# Patient Record
Sex: MALE | Race: ASIAN | NOT HISPANIC OR LATINO | URBAN - METROPOLITAN AREA
[De-identification: names, ages, dates, MRNs, and addresses within clinical notes are randomized per-mention and may not be internally consistent; named-entity substitution may affect disease eponyms.]

---

## 2017-01-18 ENCOUNTER — INPATIENT (INPATIENT)
Facility: HOSPITAL | Age: 52
LOS: 2 days | Discharge: HOME | DRG: 153 | End: 2017-01-21
Attending: OTOLARYNGOLOGY | Admitting: OTOLARYNGOLOGY
Payer: MEDICAID

## 2017-01-18 VITALS
RESPIRATION RATE: 20 BRPM | SYSTOLIC BLOOD PRESSURE: 134 MMHG | HEART RATE: 82 BPM | OXYGEN SATURATION: 98 % | HEIGHT: 66 IN | DIASTOLIC BLOOD PRESSURE: 84 MMHG | TEMPERATURE: 98 F | WEIGHT: 160.06 LBS

## 2017-01-18 DIAGNOSIS — J39.0 RETROPHARYNGEAL AND PARAPHARYNGEAL ABSCESS: ICD-10-CM

## 2017-01-18 LAB
ALBUMIN SERPL ELPH-MCNC: 3.8 G/DL — SIGNIFICANT CHANGE UP (ref 3.4–5)
ALP SERPL-CCNC: 103 U/L — SIGNIFICANT CHANGE UP (ref 40–120)
ALT FLD-CCNC: 19 U/L — SIGNIFICANT CHANGE UP (ref 12–42)
ANION GAP SERPL CALC-SCNC: 7 MMOL/L — LOW (ref 9–16)
APTT BLD: 37.1 SEC — SIGNIFICANT CHANGE UP (ref 27.5–37.4)
AST SERPL-CCNC: 10 U/L — LOW (ref 15–37)
BASOPHILS NFR BLD AUTO: 0.3 % — SIGNIFICANT CHANGE UP (ref 0–2)
BILIRUB SERPL-MCNC: 0.3 MG/DL — SIGNIFICANT CHANGE UP (ref 0.2–1.2)
BLD GP AB SCN SERPL QL: NEGATIVE — SIGNIFICANT CHANGE UP
BUN SERPL-MCNC: 18 MG/DL — SIGNIFICANT CHANGE UP (ref 7–23)
CALCIUM SERPL-MCNC: 8.6 MG/DL — SIGNIFICANT CHANGE UP (ref 8.5–10.5)
CHLORIDE SERPL-SCNC: 105 MMOL/L — SIGNIFICANT CHANGE UP (ref 96–108)
CO2 SERPL-SCNC: 27 MMOL/L — SIGNIFICANT CHANGE UP (ref 22–31)
CREAT SERPL-MCNC: 0.88 MG/DL — SIGNIFICANT CHANGE UP (ref 0.5–1.3)
EOSINOPHIL NFR BLD AUTO: 0.4 % — SIGNIFICANT CHANGE UP (ref 0–6)
GLUCOSE SERPL-MCNC: 208 MG/DL — HIGH (ref 70–99)
HCT VFR BLD CALC: 43.3 % — SIGNIFICANT CHANGE UP (ref 39–50)
HGB BLD-MCNC: 15.5 G/DL — SIGNIFICANT CHANGE UP (ref 13–17)
INR BLD: 1.08 — SIGNIFICANT CHANGE UP (ref 0.88–1.16)
LYMPHOCYTES # BLD AUTO: 29.2 % — SIGNIFICANT CHANGE UP (ref 13–44)
MCHC RBC-ENTMCNC: 30.9 PG — SIGNIFICANT CHANGE UP (ref 27–34)
MCHC RBC-ENTMCNC: 35.8 G/DL — SIGNIFICANT CHANGE UP (ref 32–36)
MCV RBC AUTO: 86.3 FL — SIGNIFICANT CHANGE UP (ref 80–100)
MONOCYTES NFR BLD AUTO: 8.2 % — SIGNIFICANT CHANGE UP (ref 2–14)
NEUTROPHILS NFR BLD AUTO: 61.9 % — SIGNIFICANT CHANGE UP (ref 43–77)
PLATELET # BLD AUTO: 172 K/UL — SIGNIFICANT CHANGE UP (ref 150–400)
POTASSIUM SERPL-MCNC: 4.6 MMOL/L — SIGNIFICANT CHANGE UP (ref 3.5–5.3)
POTASSIUM SERPL-SCNC: 4.6 MMOL/L — SIGNIFICANT CHANGE UP (ref 3.5–5.3)
PROT SERPL-MCNC: 8.2 G/DL — SIGNIFICANT CHANGE UP (ref 6.4–8.2)
PROTHROM AB SERPL-ACNC: 12 SEC — SIGNIFICANT CHANGE UP (ref 10–13.1)
RBC # BLD: 5.02 M/UL — SIGNIFICANT CHANGE UP (ref 4.2–5.8)
RBC # FLD: 12.2 % — SIGNIFICANT CHANGE UP (ref 10.3–16.9)
RH IG SCN BLD-IMP: POSITIVE — SIGNIFICANT CHANGE UP
SODIUM SERPL-SCNC: 139 MMOL/L — SIGNIFICANT CHANGE UP (ref 135–145)
WBC # BLD: 8 K/UL — SIGNIFICANT CHANGE UP (ref 3.8–10.5)
WBC # FLD AUTO: 8 K/UL — SIGNIFICANT CHANGE UP (ref 3.8–10.5)

## 2017-01-18 PROCEDURE — 99285 EMERGENCY DEPT VISIT HI MDM: CPT

## 2017-01-18 PROCEDURE — 31575 DIAGNOSTIC LARYNGOSCOPY: CPT

## 2017-01-18 PROCEDURE — 99221 1ST HOSP IP/OBS SF/LOW 40: CPT | Mod: 25

## 2017-01-18 RX ORDER — DEXTROSE 50 % IN WATER 50 %
1 SYRINGE (ML) INTRAVENOUS ONCE
Qty: 0 | Refills: 0 | Status: DISCONTINUED | OUTPATIENT
Start: 2017-01-18 | End: 2017-01-21

## 2017-01-18 RX ORDER — SODIUM CHLORIDE 9 MG/ML
1000 INJECTION, SOLUTION INTRAVENOUS
Qty: 0 | Refills: 0 | Status: DISCONTINUED | OUTPATIENT
Start: 2017-01-18 | End: 2017-01-18

## 2017-01-18 RX ORDER — AMPICILLIN SODIUM AND SULBACTAM SODIUM 250; 125 MG/ML; MG/ML
3 INJECTION, POWDER, FOR SUSPENSION INTRAMUSCULAR; INTRAVENOUS EVERY 6 HOURS
Qty: 0 | Refills: 0 | Status: DISCONTINUED | OUTPATIENT
Start: 2017-01-18 | End: 2017-01-21

## 2017-01-18 RX ORDER — ACETAMINOPHEN 500 MG
650 TABLET ORAL EVERY 6 HOURS
Qty: 0 | Refills: 0 | Status: DISCONTINUED | OUTPATIENT
Start: 2017-01-18 | End: 2017-01-21

## 2017-01-18 RX ORDER — INSULIN LISPRO 100/ML
VIAL (ML) SUBCUTANEOUS
Qty: 0 | Refills: 0 | Status: DISCONTINUED | OUTPATIENT
Start: 2017-01-18 | End: 2017-01-21

## 2017-01-18 RX ORDER — GLUCAGON INJECTION, SOLUTION 0.5 MG/.1ML
1 INJECTION, SOLUTION SUBCUTANEOUS ONCE
Qty: 0 | Refills: 0 | Status: DISCONTINUED | OUTPATIENT
Start: 2017-01-18 | End: 2017-01-21

## 2017-01-18 RX ORDER — AMPICILLIN SODIUM AND SULBACTAM SODIUM 250; 125 MG/ML; MG/ML
3 INJECTION, POWDER, FOR SUSPENSION INTRAMUSCULAR; INTRAVENOUS ONCE
Qty: 0 | Refills: 0 | Status: COMPLETED | OUTPATIENT
Start: 2017-01-18 | End: 2017-01-18

## 2017-01-18 RX ORDER — DEXTROSE 50 % IN WATER 50 %
12.5 SYRINGE (ML) INTRAVENOUS ONCE
Qty: 0 | Refills: 0 | Status: DISCONTINUED | OUTPATIENT
Start: 2017-01-18 | End: 2017-01-21

## 2017-01-18 RX ORDER — METFORMIN HYDROCHLORIDE 850 MG/1
500 TABLET ORAL EVERY 12 HOURS
Qty: 0 | Refills: 0 | Status: DISCONTINUED | OUTPATIENT
Start: 2017-01-18 | End: 2017-01-20

## 2017-01-18 RX ADMIN — AMPICILLIN SODIUM AND SULBACTAM SODIUM 200 GRAM(S): 250; 125 INJECTION, POWDER, FOR SUSPENSION INTRAMUSCULAR; INTRAVENOUS at 23:49

## 2017-01-18 RX ADMIN — METFORMIN HYDROCHLORIDE 500 MILLIGRAM(S): 850 TABLET ORAL at 21:56

## 2017-01-18 RX ADMIN — Medication 1: at 21:56

## 2017-01-18 RX ADMIN — AMPICILLIN SODIUM AND SULBACTAM SODIUM 200 GRAM(S): 250; 125 INJECTION, POWDER, FOR SUSPENSION INTRAMUSCULAR; INTRAVENOUS at 18:50

## 2017-01-18 NOTE — ED PROVIDER NOTE - ENMT, MLM
Airway patent, Nasal mucosa clear. Mouth with normal mucosa. Throat has no vesicles, no oropharyngeal exudates and uvula is midline. No stridor, no drooling, speaking without issue

## 2017-01-18 NOTE — ED ADULT TRIAGE NOTE - CHIEF COMPLAINT QUOTE
Patient c/o sore throat and difficulty breathing while laying flat x 1 month. CT done today shows, "retropharyngeal mass" with "significant compression and anterior left sided displacement of the airway."

## 2017-01-18 NOTE — H&P ADULT. - HISTORY OF PRESENT ILLNESS
HPI: 51y Male with PMH sign for DMII sent by outside ENT for evaluation and treatment of likely right retropharyngeal abscess. Patient has had a sore throat x 1 mo with some difficulty swallowing food but able to swallow food with water. Was seen at and outside ENT's office 8 days ago for evaluation. At that time he was sent for CT which showed a chronic right retropharyngeal abscess. Patient presented for follow up today with the outside ENT who sent him to the ER.  Tolerating diet. Breathing comfortably no SOB. c/o some discomfort when supine at night, otherwise no issues. no wbc. Denies voice change. Discussed patient with outside ENT Dr. Bibi Shanks.    Denies fevers/chills/nausea/vomitting/weightloss. Denies smoking/drinking. No toxic habits.     Allergies    No Known Allergies    Intolerances        PAST MEDICAL & SURGICAL HISTORY:      SOCIAL HISTORY:  Tobacco History: denies  ETOH Use: denies  Drug Use: denies    FAMILY HISTORY:      REVIEW OF SYSTEMS    General: denies f/c    HEENT: as above  Respiratory: as above  Endocrine: DM        Vital Signs Last 24 Hrs  T(C): 36.9, Max: 36.9 (01-18 @ 16:38)  T(F): 98.4, Max: 98.4 (01-18 @ 16:38)  HR: 82 (82 - 82)  BP: 134/84 (134/84 - 134/84)  BP(mean): --  RR: 20 (20 - 20)  SpO2: 98% (98% - 98%)    LABS:  CBC-    18 Jan 2017 17:23    139    |  105    |  18     ----------------------------<  208    4.6     |  27     |  0.88     Ca    8.6        18 Jan 2017 17:23    TPro  8.2    /  Alb  3.8    /  TBili  0.3    /  DBili  x      /  AST  10     /  ALT  19     /  AlkPhos  103    18 Jan 2017 17:23    Coagulation Studies-  PT/INR - ( 18 Jan 2017 17:23 )   PT: 12.0 sec;   INR: 1.08          PTT - ( 18 Jan 2017 17:23 )  PTT:37.1 sec  Endocrine Panel-  --  --  8.6 mg/dL        PHYSICAL EXAM:    ENT EXAM-   Constitutional: Well-developed, well-nourished.   voice normal   Head:  normocephalic, atraumatic.   OC/OP:  bulging right posterior oropharynx; Floor of mouth, buccal mucosa, lips, hard palate, soft palate, uvula, Mucosa moist; poor dentition  Neck:  Trachea midline. nontender to palpation.       Laryngoscopy Findings:   LARYNGOSCOPY EXAM:     -Verbal consent was obtained from patient prior to procedure.    Indication: retropharyngeal abscess    Afrin spray was applied to the nasal cavities.    Flexible laryngoscopy was performed and revealed the following:    -- Nasopharynx had no mass or exudate.    -- Base of tongue was symmetric and not enlarged.    -- submucosal bulging posterior and right lateral pharyngeal wall from the level of the oropharynx to the level of the epiglottis obscuring the glottis, however, able to see the glottis by maneuvering past the right sided bulging.     -- Vallecula was clear    -- Epiglottis, both aryepiglottic folds and both false vocal folds were normal, non-edematous     -- Arytenoids both without edema and erythema     -- True vocal folds were fully mobile and without lesions or edema     -- Post cricoid area was clear.    The patient tolerated the procedure well.    RADIOLOGY & ADDITIONAL STUDIES:  suspicious for developing retropharyngeal abscess 5.7x2.8x2.1 vs neoplasm      Assessment/Plan:  51y Male with likely chronic right retropharyngeal mass. No urgent need for OR at this time.   -Admit to ENT   -Unasyn   -f/u ESR/CRP  -f/u blood cultures  -diabetic diet  -ISS  -SCD  -HSQ  -ambulate   -oobtc  -hob elevated 30 degree    d/w attending who agrees with the above plan    [facility] at [phone] with any questions/concerns. HPI: 51y Male with PMH sign for DMII sent by outside ENT for evaluation and treatment of likely right retropharyngeal abscess.     Patient has had throat pain x 1 mo with odynophagia and some difficulty swallowing food but able to swallow food with water.   Was seen by ENT Karlos Shanks 8 days ago for evaluation and placed on omeprazole.  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 Patient presented for follow up today with the outside ENT who sent him to the ER.    He is tolerating diet. he reports throat pain is diminishing but over the last 2 weeks he has developed difficulty breathing when lying down in sleep.  He said that he stops breathing for a few seconds at a time.    States that his voice has changed.   He is afraid that this development is related to starting Invokana 3 months ago.  Denies fevers/chills/nausea/vomitting/weightloss.  Denies smoking/drinking. No toxic habits. Works as a .  No sick household contacts.  Denies hx of TB disease or exposure.  No trauma to neck  Discussed patient with outside ENT Dr. Bibi Shanks.      PAST MEDICAL & SURGICAL HISTORY:  Diabetes  No prior surgery    ALLERGIES  No Known Allergies    MEDICATIONS:  Metformin  Januvia  Invokana    SOCIAL HISTORY:  Tobacco History: denies  ETOH Use: denies  Drug Use: denies  Works as   Hal[employer] diet    FAMILY HISTORY:  N/C    REVIEW OF SYSTEMS  General: denies ffever, chills  HEENT: as above  Respiratory: as above  Endocrine: DM  Neruro:  no weakness or headache  CV:  no palpitations or chest pain  MS:  no swelling or joint pain  Skin: no rash  Allergy:  no food allergy  Psych:  no depression  :  no dysuria or hematuria  GI:  no constipation or bloody stools      Vital Signs Last 24 Hrs  T(C): 36.9, Max: 36.9 (01-18 @ 16:38)  T(F): 98.4, Max: 98.4 (01-18 @ 16:38)  HR: 82 (82 - 82)  BP: 134/84 (134/84 - 134/84)  BP(mean): --  RR: 20 (20 - 20)  SpO2: 98% (98% - 98%)    LABS:  CBC-    18 Jan 2017 17:23    139    |  105    |  18     ----------------------------<  208    4.6     |  27     |  0.88     Ca    8.6        18 Jan 2017 17:23    TPro  8.2    /  Alb  3.8    /  TBili  0.3    /  DBili  x      /  AST  10     /  ALT  19     /  AlkPhos  103    18 Jan 2017 17:23    Coagulation Studies-  PT/INR - ( 18 Jan 2017 17:23 )   PT: 12.0 sec;   INR: 1.08          PTT - ( 18 Jan 2017 17:23 )  PTT:37.1 sec  Endocrine Panel-  --  --  8.6 mg/dL        PHYSICAL EXAM:    ENT EXAM-   Constitutional: Well-developed, well-nourished.   voice normal   Head:  normocephalic, atraumatic.   OC/OP:  bulging right posterior oropharynx; Floor of mouth, buccal mucosa, lips, hard palate, soft palate, uvula, Mucosa moist; poor dentition  Neck:  Trachea midline. nontender to palpation.       Laryngoscopy Findings:   LARYNGOSCOPY EXAM:     -Verbal consent was obtained from patient prior to procedure.    Indication: retropharyngeal abscess    Afrin spray was applied to the nasal cavities.    Flexible laryngoscopy was performed and revealed the following:    -- Nasopharynx had no mass or exudate.    -- Base of tongue was symmetric and not enlarged.    -- submucosal bulging posterior and right lateral pharyngeal wall from the level of the oropharynx to the level of the epiglottis obscuring the glottis, however, able to see the glottis by maneuvering past the right sided bulging.     -- Vallecula was clear    -- Epiglottis, both aryepiglottic folds and both false vocal folds were normal, non-edematous     -- Arytenoids both without edema and erythema     -- True vocal folds were fully mobile and without lesions or edema     -- Post cricoid area was clear.    The patient tolerated the procedure well.    RADIOLOGY & ADDITIONAL STUDIES:  suspicious for developing retropharyngeal abscess 5.7x2.8x2.1 vs neoplasm      Assessment/Plan:  51y Male with likely chronic right retropharyngeal mass. No urgent need for OR at this time.   -Admit to ENT   -Unasyn   -f/u ESR/CRP  -f/u blood cultures  -diabetic diet  -ISS  -SCD  -HSQ  -ambulate   -oobtc  -hob elevated 30 degree    d/w attending who agrees with the above plan    [facility] at [phone] with any questions/concerns. HPI: 51y Male with PMH sign for DMII sent by outside ENT for evaluation and treatment of likely right retropharyngeal abscess.     Patient has had throat pain x 1 mo with odynophagia and some difficulty swallowing food but able to swallow food with water.   Was seen by ENT Karlos Shanks 8 days ago for evaluation and placed on omeprazole.  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 Patient presented for follow up today with the outside ENT who sent him to the ER.    He is tolerating diet. he reports throat pain is diminishing but over the last 2 weeks he has developed difficulty breathing when lying down in sleep.  He said that he stops breathing for a few seconds at a time.    States that his voice has changed.   He is afraid that this development is related to starting Invokana 3 months ago.  Denies fevers/chills/nausea/vomitting/weightloss.  Denies smoking/drinking. No toxic habits. Works as a .  No sick household contacts.  Denies hx of TB disease or exposure.  No trauma to neck  Discussed patient with outside ENT Dr. Bibi Shanks.      PAST MEDICAL & SURGICAL HISTORY:  Diabetes  No prior surgery    ALLERGIES  No Known Allergies    MEDICATIONS:  Metformin  Januvia  Invokana    SOCIAL HISTORY:  Tobacco History: denies  ETOH Use: denies  Drug Use: denies  Works as   Halal diet    FAMILY HISTORY:  N/C    REVIEW OF SYSTEMS  General: denies ffever, chills  HEENT: as above  Respiratory: as above  Endocrine: DM  Neruro:  no weakness or headache  CV:  no palpitations or chest pain  MS:  no swelling or joint pain  Skin: no rash  Allergy:  no food allergy  Psych:  no depression  :  no dysuria or hematuria  GI:  no constipation or bloody stools      Vital Signs Last 24 Hrs  T(C): 36.9, Max: 36.9 (01-18 @ 16:38)  T(F): 98.4, Max: 98.4 (01-18 @ 16:38)  HR: 82 (82 - 82)  BP: 134/84 (134/84 - 134/84)  BP(mean): --  RR: 20 (20 - 20)  SpO2: 98% (98% - 98%)    LABS- 18 Jan 2017 17:23  CBC            15.3  8.0 >---------< 172   (P69%)          43.8    139    |  105    |  18     ----------------------------<  208    4.6     |  27     |  0.88     Ca    8.6           TPro  8.2    /  Alb  3.8    /  TBili  0.3    /  DBili  x      /  AST  10     /  ALT  19     /  AlkPhos  103      PT: 12.0 sec;   INR: 1.08     PTT:37.1 sec        PHYSICAL EXAM:    ENT EXAM-   Constitutional: Well-developed, well-nourished. No stridor.  Voice normal   Head:  normocephalic, atraumatic. Parotid and SM glands nontender, nonenlarged bilateral.  OC/OP:  bulging right posterior oropharynx; Floor of mouth, buccal mucosa, lips, hard palate, soft palate, uvula, Mucosa moist; poor dentition  Nose:  clear anteriorly  Neck:  Trachea midline. nontender to palpation.   Lymph:  fullness right level II LN nontender  Lungs:  clear  CV:  RRR.  Carotid pulses 2+ bilat  Ext: no LE edema  Skin:  no erythema or rash  Neuro:  CN 2-12 grossly intact  Eyes:  EOMI.  wears glasses.  Psych:  mood stable.  Affect appropriate.  Calm.    Laryngoscopy Findings:   LARYNGOSCOPY EXAM:   -Verbal consent was obtained from patient prior to procedure.  Indication: retropharyngeal abscess, airway obstruction  Afrin spray was applied to the nasal cavities.    Flexible laryngoscopy was performed and revealed the following:    -- Nasopharynx had no mass or exudate.    -- Base of tongue was symmetric and not enlarged.    -- submucosal bulging posterior and right lateral pharyngeal wall from the level of the oropharynx to the level of the epiglottis.         Bulge overhangs and obscuresthe glottis but able to see the glottis by maneuvering scope past the right-sided bulge     -- Vallecula was clear    -- Epiglottis, both aryepiglottic folds and both false vocal folds were normal, non-edematous     -- Arytenoids both without edema and erythema     -- True vocal folds were fully mobile and without lesions or edema     -- Post cricoid area was clear.    -- No pooled secretions.    The patient tolerated the procedure well.    RADIOLOGY & ADDITIONAL STUDIES:  CT neck with contast was reviewed.  -- suspicious for retropharyngeal abscess 5.7 x 2.8 x 2 .1 cm with phlegmonous areas.  -- largest hypodense area is 2 cm.  -- Less likely to be neoplasm      Assessment/Plan:  51y Male with likely chronic right retropharyngeal mass. Concern for new sx of airway obstruction during sleep.  No urgent need for OR at this time.   -Admit to ENT   -Unasyn   -f/u ESR/CRP  -f/u blood cultures  -diabetic diet  -ISS  -SCD  -HSQ  -ambulate   -oobtc  -hob elevated 30 degree    d/w Dr. Shields who agrees with the above plan    [facility] at [phone] with any questions/concerns.

## 2017-01-18 NOTE — ED ADULT NURSE NOTE - OBJECTIVE STATEMENT
51 year old male patient with c/o of sore throat x1 month.  Patient states he went for CT Scan which showed a mass that is causing displacement of airway.  Patient A+OX3, ambulatory with steady gait.  Patient appears comfortable, no distress noted.  Speaking in full sentences wo difficulty.  States it justpainful to swallow.

## 2017-01-18 NOTE — H&P ADULT. - ATTENDING COMMENTS
Mr. Martinez appears to have chronic retropharyngeal abscess with throat pain that is decreasing but has airway obstruction when lying down to sleep.  Has had no antimicrobial treatment.  No large collection on CT scan.  Will try iv antibiotics first.  may require surgical intervention if worsens.

## 2017-01-18 NOTE — ED PROVIDER NOTE - MEDICAL DECISION MAKING DETAILS
sent in by his private ENT to ED for concerning CT scan and read for subacute abscess formation in retropharyngeal space with pressure on airway (but no clinical airway compromise at this time). plan for unasyn, labs including ESR/CRP, blood cultures, and admission for further monitoring

## 2017-01-18 NOTE — ED PROVIDER NOTE - OBJECTIVE STATEMENT
50yo M no PMH sent in by his ENT Dr. Karlos Shanks for abnormal CT scan with swelling to retropharyngeal space and edema. Symptoms began 1 month ago, seen by PMD, referred to ENT, had imaging done 2 days ago. Pt here with CD and report. Denies stridor, able to take po, but admits to drooling at night. No other aggravating or alleviating factors. Pain is in mid neck with no radiation

## 2017-01-19 LAB
CRP SERPL-MCNC: 2.16 MG/DL — HIGH
ERYTHROCYTE [SEDIMENTATION RATE] IN BLOOD: 22 MM/HR — HIGH
HBA1C BLD-MCNC: 7.4 % — HIGH (ref 4.8–5.6)

## 2017-01-19 PROCEDURE — 99231 SBSQ HOSP IP/OBS SF/LOW 25: CPT

## 2017-01-19 RX ORDER — HEPARIN SODIUM 5000 [USP'U]/ML
5000 INJECTION INTRAVENOUS; SUBCUTANEOUS EVERY 8 HOURS
Qty: 0 | Refills: 0 | Status: DISCONTINUED | OUTPATIENT
Start: 2017-01-19 | End: 2017-01-21

## 2017-01-19 RX ADMIN — Medication 2: at 12:11

## 2017-01-19 RX ADMIN — AMPICILLIN SODIUM AND SULBACTAM SODIUM 200 GRAM(S): 250; 125 INJECTION, POWDER, FOR SUSPENSION INTRAMUSCULAR; INTRAVENOUS at 05:34

## 2017-01-19 RX ADMIN — AMPICILLIN SODIUM AND SULBACTAM SODIUM 200 GRAM(S): 250; 125 INJECTION, POWDER, FOR SUSPENSION INTRAMUSCULAR; INTRAVENOUS at 23:37

## 2017-01-19 RX ADMIN — Medication 1: at 17:12

## 2017-01-19 RX ADMIN — Medication 1: at 21:24

## 2017-01-19 RX ADMIN — AMPICILLIN SODIUM AND SULBACTAM SODIUM 200 GRAM(S): 250; 125 INJECTION, POWDER, FOR SUSPENSION INTRAMUSCULAR; INTRAVENOUS at 17:12

## 2017-01-19 RX ADMIN — METFORMIN HYDROCHLORIDE 500 MILLIGRAM(S): 850 TABLET ORAL at 05:34

## 2017-01-19 RX ADMIN — HEPARIN SODIUM 5000 UNIT(S): 5000 INJECTION INTRAVENOUS; SUBCUTANEOUS at 13:47

## 2017-01-19 RX ADMIN — METFORMIN HYDROCHLORIDE 500 MILLIGRAM(S): 850 TABLET ORAL at 17:12

## 2017-01-19 RX ADMIN — AMPICILLIN SODIUM AND SULBACTAM SODIUM 200 GRAM(S): 250; 125 INJECTION, POWDER, FOR SUSPENSION INTRAMUSCULAR; INTRAVENOUS at 12:11

## 2017-01-19 RX ADMIN — HEPARIN SODIUM 5000 UNIT(S): 5000 INJECTION INTRAVENOUS; SUBCUTANEOUS at 21:24

## 2017-01-19 NOTE — PROGRESS NOTE ADULT - SUBJECTIVE AND OBJECTIVE BOX
ENT [facility] DAILY PROGRESS NOTE    Overnight events/Interval HPI: 51y Male w/ chronic retropharyngeal abscess now HD 1 on IV unasyn. JORDY overnight. No new complaints this AM. Breathing without issue. Able to tolerate PO. Given home metformin. Pharmacy does not have other DM meds and put on sliding scale.          Allergies    No Known Allergies    Intolerances        MEDICATIONS:  Antiinfectives:   ampicillin/sulbactam  IVPB 3Gram(s) IV Intermittent every 6 hours    IV fluids:    Hematologic/Anticoagulation:    Pain medications/Neuro:  acetaminophen   Tablet. 650milliGRAM(s) Oral every 6 hours PRN    Endocrine Medications:   insulin lispro (HumaLOG) corrective regimen sliding scale  SubCutaneous Before meals and at bedtime  dextrose Gel 1Dose(s) Oral once PRN  dextrose 50% Injectable 12.5Gram(s) IV Push once  glucagon  Injectable 1milliGRAM(s) IntraMuscular once PRN  metFORMIN 500milliGRAM(s) Oral every 12 hours    All other standing medications:     All other PRN medications:      Vital Signs Last 24 Hrs  T(C): 36.7, Max: 36.9 (01-18 @ 16:38)  T(F): 98.1, Max: 98.4 (01-18 @ 16:38)  HR: 91 (75 - 91)  BP: 117/81 (113/71 - 134/84)  BP(mean): --  RR: 15 (15 - 20)  SpO2: 100% (97% - 100%)      I & Os for current day (as of 01-19 @ 09:48)  =============================================  IN:    Oral Fluid: 320 ml    Solution: 200 ml    Total IN: 520 ml  ---------------------------------------------  OUT:    Voided: 1450 ml    Total OUT: 1450 ml  ---------------------------------------------  Total NET: -930 ml        PHYSICAL EXAM:    Voice normal   Head:  normocephalic, atraumatic. Parotid and SM glands nontender, nonenlarged bilateral.  OC/OP:  bulging right posterior oropharynx; Floor of mouth, buccal mucosa, lips, hard palate, soft palate, uvula, Mucosa moist; poor dentition  Nose:  clear anteriorly  Neck:  Trachea midline. nontender to palpation.   Lymph:  fullness right level II LN nontender  Lungs:  non-labored breathing, no stridor  Eyes:  EOMI.  wears glasses.      LABS:  CBC-                        15.5   8.0   )-----------( 172      ( 18 Jan 2017 17:23 )             43.3     BMP/CMP-  18 Jan 2017 17:23    139    |  105    |  18     ----------------------------<  208    4.6     |  27     |  0.88     Ca    8.6        18 Jan 2017 17:23    TPro  8.2    /  Alb  3.8    /  TBili  0.3    /  DBili  x      /  AST  10     /  ALT  19     /  AlkPhos  103    18 Jan 2017 17:23    Coagulation Studies-  PT/INR - ( 18 Jan 2017 17:23 )   PT: 12.0 sec;   INR: 1.08          PTT - ( 18 Jan 2017 17:23 )  PTT:37.1 sec  Endocrine Panel-  Calcium, Total Serum: 8.6 mg/dL (01-18 @ 17:23)              RADIOLOGY & ADDITIONAL STUDIES:      Assessment/Plan:  51y Male with likely chronic right retropharyngeal mass. Concern for new sx of airway obstruction during sleep.  No urgent need for OR at this time.   -Admit to ENT   -Unasyn   -f/u ESR/CRP  -f/u blood cultures  -diabetic diet  -ISS + metformin  -SCD  -HSQ  -ambulate   -oobtc  -hob elevated 30 degree    d/w Dr. Shields who agrees with the above plan    [facility] at [phone] with any questions/concerns. ENT [facility] DAILY PROGRESS NOTE    Overnight events/Interval HPI: 51y Male w/ chronic retropharyngeal abscess now HD 1 on IV unasyn.   JORDY overnight.  No new complaints this AM.   Breathing without issue when upright, only when lying down.   Able to tolerate PO.   Given home metformin. Pharmacy does not have other DM meds and put on sliding scale.          Allergies    No Known Allergies      MEDICATIONS:  Antiinfectives:   ampicillin/sulbactam  IVPB 3Gram(s) IV Intermittent every 6 hours    Pain medications/Neuro:  acetaminophen   Tablet. 650milliGRAM(s) Oral every 6 hours PRN    Endocrine Medications:   insulin lispro (HumaLOG) corrective regimen sliding scale  SubCutaneous Before meals and at bedtime  dextrose Gel 1Dose(s) Oral once PRN  dextrose 50% Injectable 12.5Gram(s) IV Push once  glucagon  Injectable 1milliGRAM(s) IntraMuscular once PRN  metFORMIN 500milliGRAM(s) Oral every 12 hours      Vital Signs Last 24 Hrs  T(C): 36.7, Max: 36.9 (01-18 @ 16:38)  T(F): 98.1, Max: 98.4 (01-18 @ 16:38)  HR: 91 (75 - 91)  BP: 117/81 (113/71 - 134/84)  RR: 15 (15 - 20)  SpO2: 100% (97% - 100%)      I & Os for current day (as of 01-19 @ 09:48)  =============================================  IN:    Oral Fluid: 320 ml    Solution: 200 ml    Total IN: 520 ml  ---------------------------------------------  OUT:    Voided: 1450 ml    Total OUT: 1450 ml  ---------------------------------------------  Total NET: -930 ml        PHYSICAL EXAM:    Voice normal   Head:  normocephalic, atraumatic. Parotid and SM glands nontender, nonenlarged bilateral.  OC/OP:  bulging right posterior oropharynx; Floor of mouth, buccal mucosa, lips, hard palate, soft palate, uvula, Mucosa moist; poor dentition  Nose:  clear anteriorly  Neck:  Trachea midline. nontender to palpation.   Lymph:  fullness right level II LN nontender  Lungs:  non-labored breathing, no stridor  Eyes:  EOMI.  wears glasses.      LABS:  CBC-                        15.5   8.0   )-----------( 172      ( 18 Jan 2017 17:23 )             43.3     BMP/CMP-  18 Jan 2017 17:23    139    |  105    |  18     ----------------------------<  208    4.6     |  27     |  0.88     Ca    8.6        18 Jan 2017 17:23    TPro  8.2    /  Alb  3.8    /  TBili  0.3    /  DBili  x      /  AST  10     /  ALT  19     /  AlkPhos  103    18 Jan 2017 17:23    Coagulation Studies-  PT/INR - ( 18 Jan 2017 17:23 )   PT: 12.0 sec;   INR: 1.08          PTT - ( 18 Jan 2017 17:23 )  PTT:37.1 sec

## 2017-01-20 LAB
CRP SERPL-MCNC: 2.66 MG/DL — HIGH
ERYTHROCYTE [SEDIMENTATION RATE] IN BLOOD: 33 MM/HR — HIGH
HCT VFR BLD CALC: 41.3 % — SIGNIFICANT CHANGE UP (ref 39–50)
HGB BLD-MCNC: 14.5 G/DL — SIGNIFICANT CHANGE UP (ref 13–17)
MCHC RBC-ENTMCNC: 29.7 PG — SIGNIFICANT CHANGE UP (ref 27–34)
MCHC RBC-ENTMCNC: 35.1 G/DL — SIGNIFICANT CHANGE UP (ref 32–36)
MCV RBC AUTO: 84.6 FL — SIGNIFICANT CHANGE UP (ref 80–100)
PLATELET # BLD AUTO: 170 K/UL — SIGNIFICANT CHANGE UP (ref 150–400)
RBC # BLD: 4.88 M/UL — SIGNIFICANT CHANGE UP (ref 4.2–5.8)
RBC # FLD: 11.8 % — SIGNIFICANT CHANGE UP (ref 10.3–16.9)
WBC # BLD: 6.6 K/UL — SIGNIFICANT CHANGE UP (ref 3.8–10.5)
WBC # FLD AUTO: 6.6 K/UL — SIGNIFICANT CHANGE UP (ref 3.8–10.5)

## 2017-01-20 PROCEDURE — 70491 CT SOFT TISSUE NECK W/DYE: CPT | Mod: 26

## 2017-01-20 PROCEDURE — 99232 SBSQ HOSP IP/OBS MODERATE 35: CPT

## 2017-01-20 PROCEDURE — 99223 1ST HOSP IP/OBS HIGH 75: CPT

## 2017-01-20 RX ADMIN — AMPICILLIN SODIUM AND SULBACTAM SODIUM 200 GRAM(S): 250; 125 INJECTION, POWDER, FOR SUSPENSION INTRAMUSCULAR; INTRAVENOUS at 12:09

## 2017-01-20 RX ADMIN — METFORMIN HYDROCHLORIDE 500 MILLIGRAM(S): 850 TABLET ORAL at 06:31

## 2017-01-20 RX ADMIN — Medication 1: at 12:30

## 2017-01-20 RX ADMIN — HEPARIN SODIUM 5000 UNIT(S): 5000 INJECTION INTRAVENOUS; SUBCUTANEOUS at 23:06

## 2017-01-20 RX ADMIN — Medication 1: at 17:17

## 2017-01-20 RX ADMIN — HEPARIN SODIUM 5000 UNIT(S): 5000 INJECTION INTRAVENOUS; SUBCUTANEOUS at 06:31

## 2017-01-20 RX ADMIN — AMPICILLIN SODIUM AND SULBACTAM SODIUM 200 GRAM(S): 250; 125 INJECTION, POWDER, FOR SUSPENSION INTRAMUSCULAR; INTRAVENOUS at 06:31

## 2017-01-20 RX ADMIN — AMPICILLIN SODIUM AND SULBACTAM SODIUM 200 GRAM(S): 250; 125 INJECTION, POWDER, FOR SUSPENSION INTRAMUSCULAR; INTRAVENOUS at 18:17

## 2017-01-20 NOTE — PROGRESS NOTE ADULT - SUBJECTIVE AND OBJECTIVE BOX
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     JORDY overnight. No new complaints this AM. Breathing without issue. Able to tolerate PO, per pt improved swallowing over past day. No fevers/chills, no difficulty breathing, no throat pain.       Allergies    No Known Allergies      MEDICATIONS:  Antiinfectives:   ampicillin/sulbactam  IVPB 3Gram(s) IV Intermittent every 6 hours    IV fluids:    Hematologic/Anticoagulation:  heparin  Injectable 5000Unit(s) SubCutaneous every 8 hours    Pain medications/Neuro:  acetaminophen   Tablet. 650milliGRAM(s) Oral every 6 hours PRN    Endocrine Medications:   insulin lispro (HumaLOG) corrective regimen sliding scale  SubCutaneous Before meals and at bedtime  dextrose Gel 1Dose(s) Oral once PRN  dextrose 50% Injectable 12.5Gram(s) IV Push once  glucagon  Injectable 1milliGRAM(s) IntraMuscular once PRN  metFORMIN 500milliGRAM(s) Oral every 12 hours    All other standing medications:     All other PRN medications:      Vital Signs Last 24 Hrs  T(C): 36.5, Max: 37.1 (01-19 @ 20:37)  T(F): 97.7, Max: 98.8 (01-19 @ 20:37)  HR: 72 (72 - 91)  BP: 111/73 (109/73 - 125/79)  BP(mean): --  RR: 16 (15 - 17)  SpO2: 100% (97% - 100%)      I & Os for current day (as of 01-20 @ 08:42)  =============================================  IN:    Oral Fluid: 1280 ml    Total IN: 1280 ml  ---------------------------------------------  OUT:    Voided: 3220 ml    Total OUT: 3220 ml  ---------------------------------------------  Total NET: -1940 ml        PHYSICAL EXAM:    Voice normal   Head:  normocephalic, atraumatic. Parotid and SM glands nontender, nonenlarged bilateral.  OC/OP:  bulging right posterior oropharynx, slightly improved since yesterday Floor of mouth, buccal mucosa, lips, hard palate, soft palate, uvula, Mucosa moist; poor dentition  Nose:  clear anteriorly  Neck:  Trachea midline. non-tender to palpation.   Lymph:  fullness right level II LN nontender  Lungs:  non-labored breathing, no stridor  Eyes:  EOMI.  wears glasses.    LABS:  CBC-                        15.5   8.0   )-----------( 172      ( 18 Jan 2017 17:23 )             43.3     BMP/CMP-  18 Jan 2017 17:23    139    |  105    |  18     ----------------------------<  208    4.6     |  27     |  0.88     Ca    8.6        18 Jan 2017 17:23    TPro  8.2    /  Alb  3.8    /  TBili  0.3    /  DBili  x      /  AST  10     /  ALT  19     /  AlkPhos  103    18 Jan 2017 17:23    Coagulation Studies-  PT/INR - ( 18 Jan 2017 17:23 )   PT: 12.0 sec;   INR: 1.08          PTT - ( 18 Jan 2017 17:23 )  PTT:37.1 sec      Assessment/Plan:  51y Male with likely chronic right retropharyngeal mass. No urgent need for OR at this time. Plan to c/w IV abx and repeat neck CT today to evaluate response.  -c/w unasyn   -f/u ESR/CRP  -f/u blood cultures  -diabetic diet  -ISS + metformin  -SCD  -HSQ  -ambulate   -oobtc  -hob elevated 30 degree  -CT scan neck with contrast today    d/w Dr. Shields who agrees with the above plan    [facility] at [phone] with any questions/concerns.    dispo:   -no home care needs at this time ENT [facility] DAILY PROGRESS NOTE    Overnight events/Interval HPI: 51y Male w/ chronic retropharyngeal abscess now hospital day 2 -- on IV unasyn since evening of 1/18/17.     JORDY overnight. No new complaints this AM.   Breathing without issue when sleeping last night (first time).   Able to tolerate PO, per pt improved swallowing over past day.   No fevers/chills, no difficulty breathing, no throat pain.       Allergies    No Known Allergies      MEDICATIONS:  Antiinfectives:   ampicillin/sulbactam  IVPB 3Gram(s) IV Intermittent every 6 hours    Hematologic/Anticoagulation:  heparin  Injectable 5000Unit(s) SubCutaneous every 8 hours    Pain medications/Neuro:  acetaminophen   Tablet. 650milliGRAM(s) Oral every 6 hours PRN    Endocrine Medications:   insulin lispro (HumaLOG) corrective regimen sliding scale  SubCutaneous Before meals and at bedtime  dextrose Gel 1Dose(s) Oral once PRN  dextrose 50% Injectable 12.5Gram(s) IV Push once  glucagon  Injectable 1milliGRAM(s) IntraMuscular once PRN  metFORMIN 500milliGRAM(s) Oral every 12 hours      Vital Signs Last 24 Hrs  T(C): 36.5, Max: 37.1 (01-19 @ 20:37)  T(F): 97.7, Max: 98.8 (01-19 @ 20:37)  HR: 72 (72 - 91)  BP: 111/73 (109/73 - 125/79)  RR: 16 (15 - 17)  SpO2: 100% (97% - 100%)      I & Os for current day (as of 01-20 @ 08:42)  =============================================  IN:    Oral Fluid: 1280 ml    Total IN: 1280 ml  ---------------------------------------------  OUT:    Voided: 3220 ml    Total OUT: 3220 ml  ---------------------------------------------  Total NET: -1940 ml        PHYSICAL EXAM:  Voice normal   Head:  normocephalic, atraumatic. Parotid and SM glands nontender, nonenlarged bilateral.  OC/OP:  bulging right posterior oropharynx, but slightly improved since yesterday.     Floor of mouth, buccal mucosa, lips, hard palate, soft palate, uvula, Mucosa moist; poor dentition  Nose:  clear anteriorly  Neck:  Trachea midline. non-tender to palpation.   Lymph:  fullness right level II LN nontender  Lungs:  non-labored breathing, no stridor  Eyes:  EOMI.  wears glasses.    LABS:  CBC-                        15.5   8.0   )-----------( 172      ( 18 Jan 2017 17:23 )             43.3     BMP/CMP-  18 Jan 2017 17:23    139    |  105    |  18     ----------------------------<  208    4.6     |  27     |  0.88     Ca    8.6        18 Jan 2017 17:23    TPro  8.2    /  Alb  3.8    /  TBili  0.3    /  DBili  x      /  AST  10     /  ALT  19     /  AlkPhos  103    18 Jan 2017 17:23    Coagulation Studies-  PT/INR - ( 18 Jan 2017 17:23 )   PT: 12.0 sec;   INR: 1.08          PTT - ( 18 Jan 2017 17:23 )  PTT:37.1 sec      Assessment/Plan:  51y Male with chronic right retropharyngeal mass. Improvement in size of swelling today.  Pt also had first night without feeling of airway of obstruction.     Plan to c/w IV abx and repeat neck CT today to evaluate response.  -f/u ESR/CRP  -f/u blood cultures  -diabetic diet  -ISS + metformin  -SCD  -HSQ  -ambulate   -oobtc  -hob elevated 30 degree  -CT scan neck with contrast today    d/w Dr. Shields who agrees with the above plan    [facility] at [phone] with any questions/concerns.    dispo:   -no home care needs at this time ENT [facility] DAILY PROGRESS NOTE    Overnight events/Interval HPI: 51y Male w/ chronic retropharyngeal abscess now hospital day 2 -- on IV unasyn since evening of 1/18/17.     JORDY overnight. No new complaints this AM.   Breathing without issue when sleeping last night (first time).   Able to tolerate PO, per pt improved swallowing over past day.   No fevers/chills, no difficulty breathing, no throat pain.       Allergies    No Known Allergies      MEDICATIONS:  Antiinfectives:   ampicillin/sulbactam  IVPB 3Gram(s) IV Intermittent every 6 hours    Hematologic/Anticoagulation:  heparin  Injectable 5000Unit(s) SubCutaneous every 8 hours    Pain medications/Neuro:  acetaminophen   Tablet. 650milliGRAM(s) Oral every 6 hours PRN    Endocrine Medications:   insulin lispro (HumaLOG) corrective regimen sliding scale  SubCutaneous Before meals and at bedtime  dextrose Gel 1Dose(s) Oral once PRN  dextrose 50% Injectable 12.5Gram(s) IV Push once  glucagon  Injectable 1milliGRAM(s) IntraMuscular once PRN  metFORMIN 500milliGRAM(s) Oral every 12 hours      Vital Signs Last 24 Hrs  T(C): 36.5, Max: 37.1 (01-19 @ 20:37)  T(F): 97.7, Max: 98.8 (01-19 @ 20:37)  HR: 72 (72 - 91)  BP: 111/73 (109/73 - 125/79)  RR: 16 (15 - 17)  SpO2: 100% (97% - 100%)      I & Os for current day (as of 01-20 @ 08:42)  =============================================  IN:    Oral Fluid: 1280 ml    Total IN: 1280 ml  ---------------------------------------------  OUT:    Voided: 3220 ml    Total OUT: 3220 ml  ---------------------------------------------  Total NET: -1940 ml        PHYSICAL EXAM:  Voice normal   Head:  normocephalic, atraumatic. Parotid and SM glands nontender, nonenlarged bilateral.  OC/OP:  bulging right posterior oropharynx, but slightly improved since yesterday.     Floor of mouth, buccal mucosa, lips, hard palate, soft palate, uvula, Mucosa moist; poor dentition  Nose:  clear anteriorly  Neck:  Trachea midline. non-tender to palpation.   Lymph:  fullness right level II LN nontender  Lungs:  non-labored breathing, no stridor  Eyes:  EOMI.  wears glasses.    LABS:  (19 Jan 2017)   ESR 22  CRP 2.16  HbA1C 7.4  Blood cx no growth to date

## 2017-01-20 NOTE — DISCHARGE NOTE ADULT - PLAN OF CARE
resolution of infection - Take Augmentin two times a day for 2 weeks  - F/u with Dr. Shields in 1 week. Call office for appointment  - Regular Diet  - Exercise as tolerated  - oral hygiene   - call for fever>102, uncontrollable pain, difficulty breathing, increased difficulty swallowing to point where unable to tolerate food - Take Augmentin two times a day for 2 weeks  - F/u with Dr. Shields in 1 week. Call office for appointment  - Regular Diet  - Exercise as tolerated  - DO NOT TAKE METFORMIN AFTER TWO DAYS BECAUSE OF RECEIVING IV CONTRAST on 1/20. RESTART ON 1/22/17  - oral hygiene   - call for fever>102, uncontrollable pain, difficulty breathing, increased difficulty swallowing to point where unable to tolerate food - Take Augmentin two times a day for 2 weeks (given 4 weeks worth of medication)  - F/u with Dr. Shields in 1 week. Call office for appointment  - Regular Diet  - Exercise as tolerated  - DO NOT TAKE METFORMIN AFTER TWO DAYS BECAUSE OF RECEIVING IV CONTRAST on 1/20. RESTART ON 1/22/17 in evening  - Do NOT take invokana  - See PCP on Tuesday to evaluate outpatient DM medications  - oral hygiene   - call for fever>102, uncontrollable pain, difficulty breathing, increased difficulty swallowing to point where unable to tolerate food

## 2017-01-20 NOTE — DISCHARGE NOTE ADULT - HOSPITAL COURSE
51y Male with PMH sign for DMII sent by outside ENT for evaluation and treatment of likely right retropharyngeal abscess.     Patient has had throat pain x 1 mo with odynophagia and some difficulty swallowing food but able to swallow food with water.   Was seen by ENT Karlos Shanks 8 days ago for evaluation and placed on omeprazole.  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 Patient presented for follow up today with the outside ENT who sent him to the ER.  He is tolerating diet. he reports throat pain is diminishing but over the last 2 weeks he has developed difficulty breathing when lying down in sleep.  He said that he stops breathing for a few seconds at a time.  States that his voice has changed.   He is afraid that this development is related to starting Invokana 3 months ago. Denies fevers/chills/nausea/vomitting/weightloss. Denies smoking/drinking. No toxic habits. Works as a . No sick household contacts. Denies hx of TB disease or exposure.No trauma to neck Discussed patient with outside ENT Dr. Bibi Shanks.    Patient progressed during two day hospital stay. Breathing difficulties, voice and throat pain have improved on IV unasyn. Patient had follow-up CT scan showing some decreased pockets, but overall grossly stable in size. Patient has been afebrile during entire stay with ESR of 22 and CRP 2.1. Patient ready for d/c 51y Male with PMH sign for DMII sent by outside ENT for evaluation and treatment of likely right retropharyngeal abscess.     Patient has had throat pain x 1 mo with odynophagia and some difficulty swallowing food but able to swallow food with water.   Was seen by ENT Karlos Shanks 8 days ago for evaluation and placed on omeprazole.  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 Patient presented for follow up today with the outside ENT who sent him to the ER.  He is tolerating diet. he reports throat pain is diminishing but over the last 2 weeks he has developed difficulty breathing when lying down in sleep.  He said that he stops breathing for a few seconds at a time.  States that his voice has changed.   He is afraid that this development is related to starting Invokana 3 months ago. Denies fevers/chills/nausea/vomitting/weightloss. Denies smoking/drinking. No toxic habits. Works as a . No sick household contacts. Denies hx of TB disease or exposure.No trauma to neck Discussed patient with outside ENT Dr. Bibi Shanks.    Patient progressed during two day hospital stay. Breathing difficulties, voice and throat pain have improved on IV unasyn. Patient had follow-up CT scan showing some decreased pockets, but overall grossly stable in size. Patient has been afebrile during entire stay with ESR of 22 and CRP 2.1. Patient 's ESR increased to 33 then 39, while CRP remained stable (increased to 2.6 then back to 2.1). Pt WBC dcreased from 8 to 6.6. HbA1c: 7.4. Patient feels much improved on day of discharge. Medicine saw him and rec: to begin Metformin 1/22 in the evening (2/2 IV contrast on 1/20) and hold invokana. He will have outpt follow up on Tuesday of this week with his PCP for further mgt.

## 2017-01-20 NOTE — DISCHARGE NOTE ADULT - MEDICATION SUMMARY - MEDICATIONS TO TAKE
I will START or STAY ON the medications listed below when I get home from the hospital:    lisinopril  --  by mouth   -- Indication: For HTN    metFORMIN  --  by mouth   -- Indication: For DM    Januvia  --  by mouth   -- Indication: For DM    simvastatin  --  by mouth   -- Indication: For Cholesterol    amoxicillin-clavulanate 875 mg-125 mg oral tablet  -- 1 tab(s) by mouth 2 times a day  -- Finish all this medication unless otherwise directed by prescriber.  Take with food or milk.    -- Indication: For Infection

## 2017-01-20 NOTE — DISCHARGE NOTE ADULT - PATIENT PORTAL LINK FT
“You can access the [facility] Patient Portal, offered by [facility], by registering with the following website: [email or web address]”

## 2017-01-20 NOTE — DISCHARGE NOTE ADULT - CARE PROVIDERS DIRECT ADDRESSES
[email or web address]

## 2017-01-20 NOTE — DISCHARGE NOTE ADULT - CARE PLAN
Principal Discharge DX:	Retropharyngeal abscess  Goal:	resolution of infection  Instructions for follow-up, activity and diet:	- Take Augmentin two times a day for 2 weeks  - F/u with Dr. Shields in 1 week. Call office for appointment  - Regular Diet  - Exercise as tolerated  - oral hygiene   - call for fever>102, uncontrollable pain, difficulty breathing, increased difficulty swallowing to point where unable to tolerate food Principal Discharge DX:	Retropharyngeal abscess  Goal:	resolution of infection  Instructions for follow-up, activity and diet:	- Take Augmentin two times a day for 2 weeks  - F/u with Dr. Shields in 1 week. Call office for appointment  - Regular Diet  - Exercise as tolerated  - DO NOT TAKE METFORMIN AFTER TWO DAYS BECAUSE OF RECEIVING IV CONTRAST on 1/20. RESTART ON 1/22/17  - oral hygiene   - call for fever>102, uncontrollable pain, difficulty breathing, increased difficulty swallowing to point where unable to tolerate food Principal Discharge DX:	Retropharyngeal abscess  Goal:	resolution of infection  Instructions for follow-up, activity and diet:	- Take Augmentin two times a day for 2 weeks (given 4 weeks worth of medication)  - F/u with Dr. Shields in 1 week. Call office for appointment  - Regular Diet  - Exercise as tolerated  - DO NOT TAKE METFORMIN AFTER TWO DAYS BECAUSE OF RECEIVING IV CONTRAST on 1/20. RESTART ON 1/22/17 in evening  - Do NOT take invokana  - See PCP on Tuesday to evaluate outpatient DM medications  - oral hygiene   - call for fever>102, uncontrollable pain, difficulty breathing, increased difficulty swallowing to point where unable to tolerate food

## 2017-01-20 NOTE — PROGRESS NOTE ADULT - ATTENDING COMMENTS
Clinically improved exam and sx of right retropharyngeal abscess/phlegmon  Will recheck CT neck  Also repeat ESR and CRP

## 2017-01-20 NOTE — PROGRESS NOTE ADULT - ASSESSMENT
Assessment/Plan:      51y Male with likely chronic right retropharyngeal mass.     Need to decrease swelling/phlegmon to alleviate airway obstruction during sleep.    No indication for OR at this time.     -Unasyn continue  -f/u ESR/CRP  -f/u blood cultures  -diabetic diet  -ISS + metformin  -SCD  -HSQ  -ambulate   -oobtc  -hob elevated 30 degree    d/w Dr. Shields who agrees with the above plan    [facility] at [phone] with any questions/concerns.
Assessment/Plan:  51y Male with chronic right retropharyngeal mass. Improvement in size of swelling today.  Pt also had first night without feeling of airway of obstruction.    -c/w IV abx   -repeat neck CT today to evaluate response.  -f/u blood cultures  -diabetic diet  -ISS + metformin  -SCD  -HSQ  -ambulate     d/w Dr. Shields who agrees with the above plan    [facility] at [phone] with any questions/concerns.

## 2017-01-20 NOTE — DISCHARGE NOTE ADULT - CARE PROVIDER_API CALL
Екатерина Shields (MD), Otolaryngology  [address] [place] [address]  Phone: [phone]  Fax: [phone]

## 2017-01-21 VITALS
SYSTOLIC BLOOD PRESSURE: 133 MMHG | OXYGEN SATURATION: 99 % | RESPIRATION RATE: 16 BRPM | HEART RATE: 76 BPM | DIASTOLIC BLOOD PRESSURE: 87 MMHG | TEMPERATURE: 98 F

## 2017-01-21 LAB
CRP SERPL-MCNC: 2.16 MG/DL — HIGH
ERYTHROCYTE [SEDIMENTATION RATE] IN BLOOD: 39 MM/HR — HIGH

## 2017-01-21 PROCEDURE — 85025 COMPLETE CBC W/AUTO DIFF WBC: CPT

## 2017-01-21 PROCEDURE — 80053 COMPREHEN METABOLIC PANEL: CPT

## 2017-01-21 PROCEDURE — 86900 BLOOD TYPING SEROLOGIC ABO: CPT

## 2017-01-21 PROCEDURE — 86850 RBC ANTIBODY SCREEN: CPT

## 2017-01-21 PROCEDURE — 83036 HEMOGLOBIN GLYCOSYLATED A1C: CPT

## 2017-01-21 PROCEDURE — 86140 C-REACTIVE PROTEIN: CPT

## 2017-01-21 PROCEDURE — 70491 CT SOFT TISSUE NECK W/DYE: CPT

## 2017-01-21 PROCEDURE — 36415 COLL VENOUS BLD VENIPUNCTURE: CPT

## 2017-01-21 PROCEDURE — 86901 BLOOD TYPING SEROLOGIC RH(D): CPT

## 2017-01-21 PROCEDURE — 99238 HOSP IP/OBS DSCHRG MGMT 30/<: CPT

## 2017-01-21 PROCEDURE — 87040 BLOOD CULTURE FOR BACTERIA: CPT

## 2017-01-21 PROCEDURE — 99285 EMERGENCY DEPT VISIT HI MDM: CPT

## 2017-01-21 PROCEDURE — 85652 RBC SED RATE AUTOMATED: CPT

## 2017-01-21 PROCEDURE — 85610 PROTHROMBIN TIME: CPT

## 2017-01-21 PROCEDURE — 85027 COMPLETE CBC AUTOMATED: CPT

## 2017-01-21 PROCEDURE — 99233 SBSQ HOSP IP/OBS HIGH 50: CPT | Mod: GC

## 2017-01-21 PROCEDURE — 85730 THROMBOPLASTIN TIME PARTIAL: CPT

## 2017-01-21 RX ADMIN — Medication 3: at 12:44

## 2017-01-21 RX ADMIN — AMPICILLIN SODIUM AND SULBACTAM SODIUM 200 GRAM(S): 250; 125 INJECTION, POWDER, FOR SUSPENSION INTRAMUSCULAR; INTRAVENOUS at 17:45

## 2017-01-21 RX ADMIN — AMPICILLIN SODIUM AND SULBACTAM SODIUM 200 GRAM(S): 250; 125 INJECTION, POWDER, FOR SUSPENSION INTRAMUSCULAR; INTRAVENOUS at 00:30

## 2017-01-21 RX ADMIN — AMPICILLIN SODIUM AND SULBACTAM SODIUM 200 GRAM(S): 250; 125 INJECTION, POWDER, FOR SUSPENSION INTRAMUSCULAR; INTRAVENOUS at 06:06

## 2017-01-21 RX ADMIN — AMPICILLIN SODIUM AND SULBACTAM SODIUM 200 GRAM(S): 250; 125 INJECTION, POWDER, FOR SUSPENSION INTRAMUSCULAR; INTRAVENOUS at 12:44

## 2017-01-21 RX ADMIN — Medication: at 00:29

## 2017-01-21 NOTE — CONSULT NOTE ADULT - ATTENDING COMMENTS
DC planning in terms of DM mgt requested by ENT.    Uncontrolled DM.  Here managed with insulin tx while inpatient.  Now cleared by ENT to DC home after evening IV ABX dose.  IV contrast yesterday.    Resume metformin/glyburide, and Januvia TOMORROW Sunday 1/22 to prevent renal injury.  HOLD invokana until completely out of the woods from his ENT infection issues, estim for 1+wk, to be reassessed by PCP/endo based on the pt's clinical improvement.     Pt understands plan.  Spoke with the nurse.

## 2017-01-21 NOTE — PROGRESS NOTE ADULT - SUBJECTIVE AND OBJECTIVE BOX
ENT [facility] DAILY PROGRESS NOTE    Overnight events/Interval HPI: 51y Male w/ chronic retropharyngeal abscess now hospital day 3 -- on IV unasyn since evening of 1/18/17.     JORDY overnight. No new complaints this AM.   Continued improvement. Breathing without issue when sleeping last night  Able to tolerate PO, per pt improved swallowing over past day.   No fevers/chills, no difficulty breathing, no throat pain.   Awaiting AM ESR/CRP to determine dispo planning      Allergies    No Known Allergies    Intolerances        MEDICATIONS:  Antiinfectives:   ampicillin/sulbactam  IVPB 3Gram(s) IV Intermittent every 6 hours    IV fluids:    Hematologic/Anticoagulation:  heparin  Injectable 5000Unit(s) SubCutaneous every 8 hours    Pain medications/Neuro:  acetaminophen   Tablet. 650milliGRAM(s) Oral every 6 hours PRN    Endocrine Medications:   insulin lispro (HumaLOG) corrective regimen sliding scale  SubCutaneous Before meals and at bedtime  dextrose Gel 1Dose(s) Oral once PRN  dextrose 50% Injectable 12.5Gram(s) IV Push once  glucagon  Injectable 1milliGRAM(s) IntraMuscular once PRN    All other standing medications:     All other PRN medications:      Vital Signs Last 24 Hrs  T(C): 36.5, Max: 37.1 (01-20 @ 14:01)  T(F): 97.7, Max: 98.8 (01-20 @ 14:01)  HR: 74 (74 - 86)  BP: 111/66 (110/68 - 126/83)  BP(mean): --  RR: 16 (16 - 17)  SpO2: 99% (96% - 99%)      I & Os for current day (as of 01-21 @ 09:16)  =============================================  IN:    Oral Fluid: 1380 ml    Solution: 100 ml    Total IN: 1480 ml  ---------------------------------------------  OUT:    Voided: 1552 ml    Total OUT: 1552 ml  ---------------------------------------------  Total NET: -72 ml        PHYSICAL EXAM:    PHYSICAL EXAM:  Voice normal   Head:  normocephalic, atraumatic. Parotid and SM glands nontender, nonenlarged bilateral.  OC/OP:  bulging right posterior oropharynx, but slightly improved since yesterday.     Floor of mouth, buccal mucosa, lips, hard palate, soft palate, uvula, Mucosa moist; poor dentition  Nose:  clear anteriorly  Neck:  Trachea midline. non-tender to palpation.   Lymph:  fullness right level II LN nontender  Lungs:  non-labored breathing, no stridor  Eyes:  EOMI.  wears glasses.    LABS:  (21 Jan 2017)   ESR: pending  CRP 2.16  Blood cx no growth to date    LABS:  CBC-                        14.5   6.6   )-----------( 170      ( 20 Jan 2017 13:49 )             41.3     BMP/CMP-        Coagulation Studies-    Endocrine Panel-              RADIOLOGY & ADDITIONAL STUDIES:  CT neck: stable grossly in size with some decreased in pockets of mass    Assessment/Plan:  51y Male with chronic right retropharyngeal mass. Improvement in size of swelling today.  Pt without feeling of airway of obstruction.    -c/w IV abx   -f/u blood cultures  -diabetic diet  -ISS + metformin (Held until 1/22 2/2 IV contrast)  -SCD  -HSQ  -ambulate   - f/u repeat CRP/ESR    d/w Dr. Shields who agrees with the above plan    [facility] at [phone] with any questions/concerns.      PPX: SCDs, DVT ppx with SUBQ hep.    Dispo planning:   -Home care is/is not needed

## 2017-01-21 NOTE — CONSULT NOTE ADULT - SUBJECTIVE AND OBJECTIVE BOX
51 yea old male,  with HTN, DM 2 on oral agents with no nephropathy, HLD, present 51 yea old male,  with HTN, DM 2 on oral agents with no nephropathy, HLD, presents after seeing PCP and then ENT for SOB when laying flat and mild odynophagia x1 month to solids, denies f/c/s, got a CT scan with outside ENT and told to come to ER for chronic right retropharyngeal abscess. Patient had wisdom tooth left side removed 6 months ago. Was admitted to ENT and put on IV unasyn s/p CT IV contrast study on 1/20 and was held of PO DM medication and placed on SCC while here. Of note patient reports 3 months ago was started on Invokana for A1c 9.5 in addition to his other oral agents.    Vital Signs Last 24 Hrs    T(F): 97.9, Max: 98.3 (01-21 @ 09:00)  HR: 73 (73 - 80)  BP: 124/72 (110/68 - 127/80)  RR: 16 (16 - 17)  SpO2: 97% (96% - 99%)  RA    HPI:  HPI: 51y Male with PMH sign for DMII sent by outside ENT for evaluation and treatment of likely right retropharyngeal abscess.     Patient has had throat pain x 1 mo with odynophagia and some difficulty swallowing food but able to swallow food with water.   Was seen by ENT Karlos Shanks 8 days ago for evaluation and placed on omeprazole.  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 Patient presented for follow up today with the outside ENT who sent him to the ER.    He is tolerating diet. he reports throat pain is diminishing but over the last 2 weeks he has developed difficulty breathing when lying down in sleep.  He said that he stops breathing for a few seconds at a time.    States that his voice has changed.   He is afraid that this development is related to starting Invokana 3 months ago.  Denies fevers/chills/nausea/vomitting/weightloss.  Denies smoking/drinking. No toxic habits. Works as a .  No sick household contacts.  Denies hx of TB disease or exposure.  No trauma to neck  Discussed patient with outside ENT Dr. Bibi Shanks.      PAST MEDICAL & SURGICAL HISTORY:  Diabetes  No prior surgery    ALLERGIES  No Known Allergies    MEDICATIONS:  Metformin  Januvia  Invokana    SOCIAL HISTORY:  Tobacco History: denies  ETOH Use: denies  Drug Use: denies  Works as   [employer] diet    FAMILY HISTORY:  N/C    REVIEW OF SYSTEMS  General: denies ffever, chills  HEENT: as above  Respiratory: as above  Endocrine: DM  Neruro:  no weakness or headache  CV:  no palpitations or chest pain  MS:  no swelling or joint pain  Skin: no rash  Allergy:  no food allergy  Psych:  no depression  :  no dysuria or hematuria  GI:  no constipation or bloody stools      Vital Signs Last 24 Hrs  T(C): 36.9, Max: 36.9 (01-18 @ 16:38)  T(F): 98.4, Max: 98.4 (01-18 @ 16:38)  HR: 82 (82 - 82)  BP: 134/84 (134/84 - 134/84)  BP(mean): --  RR: 20 (20 - 20)  SpO2: 98% (98% - 98%)    LABS- 18 Jan 2017 17:23  CBC            15.3  8.0 >---------< 172   (P69%)          43.8    139    |  105    |  18     ----------------------------<  208    4.6     |  27     |  0.88     Ca    8.6           TPro  8.2    /  Alb  3.8    /  TBili  0.3    /  DBili  x      /  AST  10     /  ALT  19     /  AlkPhos  103      PT: 12.0 sec;   INR: 1.08     PTT:37.1 sec        PHYSICAL EXAM:    ENT EXAM-   Constitutional: Well-developed, well-nourished. No stridor.  Voice normal   Head:  normocephalic, atraumatic. Parotid and SM glands nontender, nonenlarged bilateral.  OC/OP:  bulging right posterior oropharynx; Floor of mouth, buccal mucosa, lips, hard palate, soft palate, uvula, Mucosa moist; poor dentition  Nose:  clear anteriorly  Neck:  Trachea midline. nontender to palpation.   Lymph:  fullness right level II LN nontender  Lungs:  clear  CV:  RRR.  Carotid pulses 2+ bilat  Ext: no LE edema  Skin:  no erythema or rash  Neuro:  CN 2-12 grossly intact  Eyes:  EOMI.  wears glasses.  Psych:  mood stable.  Affect appropriate.  Calm.    Laryngoscopy Findings:   LARYNGOSCOPY EXAM:   -Verbal consent was obtained from patient prior to procedure.  Indication: retropharyngeal abscess, airway obstruction  Afrin spray was applied to the nasal cavities.    Flexible laryngoscopy was performed and revealed the following:    -- Nasopharynx had no mass or exudate.    -- Base of tongue was symmetric and not enlarged.    -- submucosal bulging posterior and right lateral pharyngeal wall from the level of the oropharynx to the level of the epiglottis.         Bulge overhangs and obscuresthe glottis but able to see the glottis by maneuvering scope past the right-sided bulge     -- Vallecula was clear    -- Epiglottis, both aryepiglottic folds and both false vocal folds were normal, non-edematous     -- Arytenoids both without edema and erythema     -- True vocal folds were fully mobile and without lesions or edema     -- Post cricoid area was clear.    -- No pooled secretions.    The patient tolerated the procedure well.    RADIOLOGY & ADDITIONAL STUDIES:  CT neck with contast was reviewed.  -- suspicious for retropharyngeal abscess 5.7 x 2.8 x 2 .1 cm with phlegmonous areas.  -- largest hypodense area is 2 cm.  -- Less likely to be neoplasm      Assessment/Plan:  51y Male with likely chronic right retropharyngeal mass. Concern for new sx of airway obstruction during sleep.  No urgent need for OR at this time.   -Admit to ENT   -Unasyn   -f/u ESR/CRP  -f/u blood cultures  -diabetic diet  -ISS  -SCD  -HSQ  -ambulate   -oobtc  -hob elevated 30 degree    d/w Dr. Shields who agrees with the above plan    [facility] at [phone] with any questions/concerns. (18 Jan 2017 18:08)      PAST MEDICAL & SURGICAL HISTORY:  High cholesterol  Diabetes      REVIEW OF SYSTEMS      General:	    Skin/Breast:  	  Ophthalmologic:  	  ENMT:	    Respiratory and Thorax:  	  Cardiovascular:	    Gastrointestinal:	    Genitourinary:	    Musculoskeletal:	    Neurological:	    Psychiatric:	    Hematology/Lymphatics:	    Endocrine:	    Allergic/Immunologic:	    MEDICATIONS  (STANDING):  ampicillin/sulbactam  IVPB 3Gram(s) IV Intermittent every 6 hours  insulin lispro (HumaLOG) corrective regimen sliding scale  SubCutaneous Before meals and at bedtime  dextrose 50% Injectable 12.5Gram(s) IV Push once  heparin  Injectable 5000Unit(s) SubCutaneous every 8 hours    MEDICATIONS  (PRN):  acetaminophen   Tablet. 650milliGRAM(s) Oral every 6 hours PRN pain and/or fever>100.6  dextrose Gel 1Dose(s) Oral once PRN Blood Glucose LESS THAN 70 milliGRAM(s)/deciliter  glucagon  Injectable 1milliGRAM(s) IntraMuscular once PRN Glucose LESS THAN 70 milligrams/deciliter      Allergies    No Known Allergies    Intolerances        SOCIAL HISTORY:    FAMILY HISTORY:      Vital Signs Last 24 Hrs  T(C): 36.6, Max: 36.8 (01-21 @ 09:00)  T(F): 97.9, Max: 98.3 (01-21 @ 09:00)  HR: 73 (73 - 80)  BP: 124/72 (110/68 - 127/80)  BP(mean): --  RR: 16 (16 - 17)  SpO2: 97% (96% - 99%)    PHYSICAL EXAM:      Constitutional: non toxic pleasant overweight male     Eyes: PERRL EOMI    ENMT: MMM very poor dentition     Neck: supple no JVD no palpable LAD     Respiratory: CTA BL no w/r/r    Cardiovascular: S1S2 no m/r/g    Gastrointestinal: soft NTND +BS obese     Extremities: warm symmetric no c/c/e, BL feet onychomycosis     Vascular: +DP BL     Neurological: AAOx3 moves all 4 extremities     Lymph Nodes: no palpable cervical LAD    Musculoskeletal: full ROM           LABS:                        14.5   6.6   )-----------( 170      ( 20 Jan 2017 13:49 )             41.3         RADIOLOGY & ADDITIONAL STUDIES: 51 year old male,  with HTN, DM 2 on oral agents with no nephropathy, HLD, presents after seeing PCP and then ENT for SOB when laying flat and mild odynophagia x1 month to solids, denies f/c/s, got a CT scan with outside ENT and told to come to ER for chronic right retropharyngeal abscess. Patient had wisdom tooth left side removed 6 months ago. Was admitted to ENT and put on IV unasyn s/p CT IV contrast study on 1/20 and was held of PO DM medication and placed on SCC while here. Of note patient reports 3 months ago was started on Invokana for A1c 9.5 in addition to his other oral agents.    Vital Signs Last 24 Hrs    T(F): 97.9, Max: 98.3 (01-21 @ 09:00)  HR: 73 (73 - 80)  BP: 124/72 (110/68 - 127/80)  RR: 16 (16 - 17)  SpO2: 97% (96% - 99%)  RA    No prior surgery    ALLERGIES  No Known Allergies    MEDICATIONS:  Metformin/ glyburide 500-2.5 mg 2 tabs BID  Januvia 100 mg daily   Invokana 100 mg daily  simvastatin  lisinopril     SOCIAL HISTORY:  Tobacco History: denies  ETOH Use: denies  Drug Use: denies  Works as   [employer] diet    FAMILY HISTORY:  N/C    REVIEW OF SYSTEMS  General: denies fever, chills  HEENT: as above  Respiratory: SOB laying down improved   Neruro:  no weakness or headache  CV:  no palpitations or chest pain  MS:  no swelling or joint pain  Skin: no rash        LABS- 18 Jan 2017 17:23  CBC            15.3  8.0 >---------< 172   (P69%)          43.8    139    |  105    |  18     ----------------------------<  208    4.6     |  27     |  0.88     Ca    8.6           TPro  8.2    /  Alb  3.8    /  TBili  0.3    /  DBili  x      /  AST  10     /  ALT  19     /  AlkPhos  103      PT: 12.0 sec;   INR: 1.08     PTT:37.1 sec    RADIOLOGY & ADDITIONAL STUDIES:  CT neck with contast was reviewed.  -- suspicious for retropharyngeal abscess 5.7 x 2.8 x 2 .1 cm with phlegmonous areas.  -- largest hypodense area is 2 cm.  -- Less likely to be neoplasm    PHYSICAL EXAM:    ENT EXAM-   Constitutional: Well-developed, well-nourished. No stridor. non toxic.   Voice normal   Head:  normocephalic, atraumatic. Parotid and SM glands nontender, nonenlarged bilateral.  ENT: PERRL EOMI glasses MMM very poor dentition  Nose:  clear anteriorly  Neck:  Trachea midline. nontender to palpation   Lymph:  fullness right mid neck NTTP no warmth appreciated   Lungs:  CTA BL no w/r/r  CV:  RRR.  S1 S2 no m/r/g   GI: soft NTND +BS obese   Ext: warm symmetric no c/c/e, BL feet onychomycosis   Skin:  no erythema or rash  Neuro:  CN 2-12 grossly intact moves all 4 extremities     Psych:  mood stable.  Affect appropriate.  Calm.      Flexible laryngoscopy was performed and revealed the following:    -- Nasopharynx had no mass or exudate.    -- Base of tongue was symmetric and not enlarged.    -- submucosal bulging posterior and right lateral pharyngeal wall from the level of the oropharynx to the level of the epiglottis.         Bulge overhangs and obscures the glottis but able to see the glottis by maneuvering scope past the right-sided bulge     -- Vallecula was clear    -- Epiglottis, both aryepiglottic folds and both false vocal folds were normal, non-edematous     -- Arytenoids both without edema and erythema     -- True vocal folds were fully mobile and without lesions or edema     -- Post cricoid area was clear.    -- No pooled secretions.      MEDICATIONS  (STANDING):  ampicillin/sulbactam  IVPB 3Gram(s) IV Intermittent every 6 hours  insulin lispro (HumaLOG) corrective regimen sliding scale  SubCutaneous Before meals and at bedtime  dextrose 50% Injectable 12.5Gram(s) IV Push once  heparin  Injectable 5000Unit(s) SubCutaneous every 8 hours    MEDICATIONS  (PRN):  acetaminophen   Tablet. 650milliGRAM(s) Oral every 6 hours PRN pain and/or fever>100.6  dextrose Gel 1Dose(s) Oral once PRN Blood Glucose LESS THAN 70 milliGRAM(s)/deciliter  glucagon  Injectable 1milliGRAM(s) IntraMuscular once PRN Glucose LESS THAN 70 milligrams/deciliter    A&P 50 yo UC DM2 on oral agents no nephropathy consulted for diabetes management on discharge     1. DM2 Can continue with PO medication of metformin/glyburide combination with januvia 48 hours from IV contrast study and HOLD invokana for a week   - discuss plan with PCP on Tuesday appointment Dr. Sawyer Jett   - no renal disfunction on lasbs  -A1c 7.4 from per patient 9.5 3 months ago   - will cw FS checks at home was ranging 's  - during stay 's to x1 258 today ~6U coverage 24 hours   - diabetes diet  - rec podiatry eval not seen for above findings and due for opthalmology this year (annual)  - Dr. Castaneda to see patient

## 2017-01-23 LAB
CULTURE RESULTS: SIGNIFICANT CHANGE UP
CULTURE RESULTS: SIGNIFICANT CHANGE UP
SPECIMEN SOURCE: SIGNIFICANT CHANGE UP
SPECIMEN SOURCE: SIGNIFICANT CHANGE UP

## 2017-01-24 DIAGNOSIS — E78.5 HYPERLIPIDEMIA, UNSPECIFIED: ICD-10-CM

## 2017-01-24 DIAGNOSIS — J39.0 RETROPHARYNGEAL AND PARAPHARYNGEAL ABSCESS: ICD-10-CM

## 2017-01-24 DIAGNOSIS — Z79.84 LONG TERM (CURRENT) USE OF ORAL HYPOGLYCEMIC DRUGS: ICD-10-CM

## 2017-01-24 DIAGNOSIS — E11.65 TYPE 2 DIABETES MELLITUS WITH HYPERGLYCEMIA: ICD-10-CM

## 2017-01-24 DIAGNOSIS — R13.19 OTHER DYSPHAGIA: ICD-10-CM

## 2017-01-24 DIAGNOSIS — J98.8 OTHER SPECIFIED RESPIRATORY DISORDERS: ICD-10-CM

## 2017-01-24 PROBLEM — Z00.00 ENCOUNTER FOR PREVENTIVE HEALTH EXAMINATION: Status: ACTIVE | Noted: 2017-01-24

## 2017-01-27 ENCOUNTER — APPOINTMENT (OUTPATIENT)
Dept: OTOLARYNGOLOGY | Facility: CLINIC | Age: 52
End: 2017-01-27

## 2017-01-27 VITALS
BODY MASS INDEX: 26.36 KG/M2 | WEIGHT: 164 LBS | HEART RATE: 74 BPM | SYSTOLIC BLOOD PRESSURE: 137 MMHG | DIASTOLIC BLOOD PRESSURE: 88 MMHG | HEIGHT: 66 IN

## 2017-01-27 DIAGNOSIS — Z78.9 OTHER SPECIFIED HEALTH STATUS: ICD-10-CM

## 2017-01-28 PROBLEM — E78.00 PURE HYPERCHOLESTEROLEMIA, UNSPECIFIED: Chronic | Status: ACTIVE | Noted: 2017-01-18

## 2017-01-28 PROBLEM — E11.9 TYPE 2 DIABETES MELLITUS WITHOUT COMPLICATIONS: Chronic | Status: ACTIVE | Noted: 2017-01-18

## 2017-02-22 ENCOUNTER — APPOINTMENT (OUTPATIENT)
Dept: OTOLARYNGOLOGY | Facility: CLINIC | Age: 52
End: 2017-02-22

## 2017-02-22 VITALS
WEIGHT: 163 LBS | HEART RATE: 80 BPM | BODY MASS INDEX: 26.2 KG/M2 | SYSTOLIC BLOOD PRESSURE: 130 MMHG | DIASTOLIC BLOOD PRESSURE: 75 MMHG | HEIGHT: 66 IN

## 2017-02-22 RX ORDER — AMOXICILLIN AND CLAVULANATE POTASSIUM 875; 125 MG/1; MG/1
875-125 TABLET, COATED ORAL
Qty: 28 | Refills: 0 | Status: COMPLETED | COMMUNITY
End: 2017-02-17

## 2017-02-27 ENCOUNTER — OUTPATIENT (OUTPATIENT)
Dept: OUTPATIENT SERVICES | Facility: HOSPITAL | Age: 52
LOS: 1 days | End: 2017-02-27
Payer: MEDICAID

## 2017-02-27 PROCEDURE — 70491 CT SOFT TISSUE NECK W/DYE: CPT | Mod: 26

## 2017-02-27 PROCEDURE — 70491 CT SOFT TISSUE NECK W/DYE: CPT

## 2017-03-09 ENCOUNTER — APPOINTMENT (OUTPATIENT)
Dept: OTOLARYNGOLOGY | Facility: CLINIC | Age: 52
End: 2017-03-09

## 2017-03-09 VITALS
HEART RATE: 84 BPM | WEIGHT: 160 LBS | HEIGHT: 66 IN | BODY MASS INDEX: 25.71 KG/M2 | SYSTOLIC BLOOD PRESSURE: 135 MMHG | DIASTOLIC BLOOD PRESSURE: 86 MMHG

## 2017-04-10 ENCOUNTER — OUTPATIENT (OUTPATIENT)
Dept: OUTPATIENT SERVICES | Facility: HOSPITAL | Age: 52
LOS: 1 days | End: 2017-04-10
Payer: MEDICAID

## 2017-04-10 PROCEDURE — 70491 CT SOFT TISSUE NECK W/DYE: CPT | Mod: 26

## 2017-04-10 PROCEDURE — 70491 CT SOFT TISSUE NECK W/DYE: CPT

## 2017-04-12 ENCOUNTER — APPOINTMENT (OUTPATIENT)
Dept: OTOLARYNGOLOGY | Facility: CLINIC | Age: 52
End: 2017-04-12

## 2017-04-12 VITALS
BODY MASS INDEX: 27 KG/M2 | HEART RATE: 62 BPM | SYSTOLIC BLOOD PRESSURE: 128 MMHG | HEIGHT: 66 IN | DIASTOLIC BLOOD PRESSURE: 77 MMHG | WEIGHT: 168 LBS

## 2017-04-12 RX ORDER — AMOXICILLIN AND CLAVULANATE POTASSIUM 875; 125 MG/1; 1/1
875-125 TABLET, FILM COATED ORAL
Refills: 0 | Status: COMPLETED | COMMUNITY
End: 2017-03-12

## 2017-04-12 RX ORDER — AMOXICILLIN AND CLAVULANATE POTASSIUM 875; 125 MG/1; MG/1
875-125 TABLET, COATED ORAL
Qty: 42 | Refills: 0 | Status: COMPLETED | COMMUNITY
Start: 2017-03-09 | End: 2017-03-31

## 2017-04-12 RX ORDER — GLYBURIDE AND METFORMIN HYDROCHLORIDE 2.5; 5 MG/1; MG/1
2.5-5 TABLET, FILM COATED ORAL
Qty: 120 | Refills: 0 | Status: ACTIVE | COMMUNITY
Start: 2016-12-19

## 2017-05-24 ENCOUNTER — APPOINTMENT (OUTPATIENT)
Dept: OTOLARYNGOLOGY | Facility: CLINIC | Age: 52
End: 2017-05-24

## 2017-05-24 RX ORDER — METFORMIN HYDROCHLORIDE 625 MG/1
TABLET ORAL
Refills: 0 | Status: COMPLETED | COMMUNITY
End: 2017-05-24

## 2017-05-24 RX ORDER — FENOFIBRATE 54 MG/1
54 TABLET ORAL
Qty: 30 | Refills: 0 | Status: COMPLETED | COMMUNITY
Start: 2017-03-27 | End: 2017-05-24

## 2017-05-24 RX ORDER — ATORVASTATIN CALCIUM 20 MG/1
20 TABLET, FILM COATED ORAL
Qty: 30 | Refills: 0 | Status: COMPLETED | COMMUNITY
Start: 2017-04-01 | End: 2017-05-24

## 2017-07-17 ENCOUNTER — OUTPATIENT (OUTPATIENT)
Dept: OUTPATIENT SERVICES | Facility: HOSPITAL | Age: 52
LOS: 1 days | End: 2017-07-17
Payer: MEDICAID

## 2017-07-17 PROCEDURE — 70540 MRI ORBIT/FACE/NECK W/O DYE: CPT

## 2017-07-17 PROCEDURE — 70540 MRI ORBIT/FACE/NECK W/O DYE: CPT | Mod: 26

## 2017-08-23 ENCOUNTER — APPOINTMENT (OUTPATIENT)
Dept: OTOLARYNGOLOGY | Facility: CLINIC | Age: 52
End: 2017-08-23
Payer: MEDICAID

## 2017-08-23 VITALS — HEART RATE: 83 BPM | DIASTOLIC BLOOD PRESSURE: 76 MMHG | SYSTOLIC BLOOD PRESSURE: 113 MMHG

## 2017-08-23 DIAGNOSIS — R49.8 OTHER VOICE AND RESONANCE DISORDERS: ICD-10-CM

## 2017-08-23 DIAGNOSIS — F45.8 OTHER SOMATOFORM DISORDERS: ICD-10-CM

## 2017-08-23 PROCEDURE — 99214 OFFICE O/P EST MOD 30 MIN: CPT | Mod: 25

## 2017-08-23 PROCEDURE — 31575 DIAGNOSTIC LARYNGOSCOPY: CPT

## 2017-08-27 PROBLEM — F45.8 GLOBUS SENSATION: Status: RESOLVED | Noted: 2017-03-11 | Resolved: 2017-08-27

## 2017-08-27 PROBLEM — R49.8 OTHER VOICE DISTURBANCE: Status: RESOLVED | Noted: 2017-04-12 | Resolved: 2017-08-27

## 2018-04-11 ENCOUNTER — APPOINTMENT (OUTPATIENT)
Dept: OTOLARYNGOLOGY | Facility: CLINIC | Age: 53
End: 2018-04-11
Payer: MEDICAID

## 2018-04-11 VITALS
HEIGHT: 66 IN | HEART RATE: 86 BPM | DIASTOLIC BLOOD PRESSURE: 76 MMHG | SYSTOLIC BLOOD PRESSURE: 122 MMHG | WEIGHT: 165 LBS | BODY MASS INDEX: 26.52 KG/M2

## 2018-04-11 DIAGNOSIS — K21.9 GASTRO-ESOPHAGEAL REFLUX DISEASE W/OUT ESOPHAGITIS: ICD-10-CM

## 2018-04-11 DIAGNOSIS — Z87.09 PERSONAL HISTORY OF OTHER DISEASES OF THE RESPIRATORY SYSTEM: ICD-10-CM

## 2018-04-11 DIAGNOSIS — R13.13 DYSPHAGIA, PHARYNGEAL PHASE: ICD-10-CM

## 2018-04-11 PROCEDURE — 99214 OFFICE O/P EST MOD 30 MIN: CPT | Mod: 25

## 2018-04-11 PROCEDURE — 31575 DIAGNOSTIC LARYNGOSCOPY: CPT

## 2018-04-12 PROBLEM — R13.13 PHARYNGEAL DYSPHAGIA: Status: RESOLVED | Noted: 2017-03-11 | Resolved: 2018-04-12

## 2018-04-12 PROBLEM — Z87.09 HISTORY OF RETROPHARYNGEAL ABSCESS: Status: RESOLVED | Noted: 2017-03-11 | Resolved: 2018-04-12

## 2018-08-15 ENCOUNTER — APPOINTMENT (OUTPATIENT)
Dept: OTOLARYNGOLOGY | Facility: CLINIC | Age: 53
End: 2018-08-15

## 2019-03-19 VITALS
TEMPERATURE: 99 F | HEIGHT: 66 IN | RESPIRATION RATE: 18 BRPM | OXYGEN SATURATION: 99 % | SYSTOLIC BLOOD PRESSURE: 146 MMHG | WEIGHT: 160.06 LBS | DIASTOLIC BLOOD PRESSURE: 91 MMHG | HEART RATE: 86 BPM

## 2019-03-19 LAB
ALBUMIN SERPL ELPH-MCNC: 4.9 G/DL — SIGNIFICANT CHANGE UP (ref 3.3–5)
ALP SERPL-CCNC: 74 U/L — SIGNIFICANT CHANGE UP (ref 40–120)
ALT FLD-CCNC: SIGNIFICANT CHANGE UP U/L (ref 10–45)
ANION GAP SERPL CALC-SCNC: 15 MMOL/L — SIGNIFICANT CHANGE UP (ref 5–17)
APTT BLD: 32.1 SEC — SIGNIFICANT CHANGE UP (ref 27.5–36.3)
AST SERPL-CCNC: SIGNIFICANT CHANGE UP U/L (ref 10–40)
BASOPHILS # BLD AUTO: 0.03 K/UL — SIGNIFICANT CHANGE UP (ref 0–0.2)
BASOPHILS NFR BLD AUTO: 0.5 % — SIGNIFICANT CHANGE UP (ref 0–2)
BILIRUB SERPL-MCNC: 0.3 MG/DL — SIGNIFICANT CHANGE UP (ref 0.2–1.2)
BUN SERPL-MCNC: 16 MG/DL — SIGNIFICANT CHANGE UP (ref 7–23)
CALCIUM SERPL-MCNC: 10 MG/DL — SIGNIFICANT CHANGE UP (ref 8.4–10.5)
CHLORIDE SERPL-SCNC: 101 MMOL/L — SIGNIFICANT CHANGE UP (ref 96–108)
CHOLEST SERPL-MCNC: 217 MG/DL — HIGH (ref 10–199)
CK MB CFR SERPL CALC: 1.6 NG/ML — SIGNIFICANT CHANGE UP (ref 0–6.7)
CK SERPL-CCNC: 83 U/L — SIGNIFICANT CHANGE UP (ref 30–200)
CO2 SERPL-SCNC: 22 MMOL/L — SIGNIFICANT CHANGE UP (ref 22–31)
CREAT SERPL-MCNC: 0.81 MG/DL — SIGNIFICANT CHANGE UP (ref 0.5–1.3)
CRP SERPL-MCNC: 0.11 MG/DL — SIGNIFICANT CHANGE UP (ref 0–0.4)
EOSINOPHIL # BLD AUTO: 0.03 K/UL — SIGNIFICANT CHANGE UP (ref 0–0.5)
EOSINOPHIL NFR BLD AUTO: 0.5 % — SIGNIFICANT CHANGE UP (ref 0–6)
ERYTHROCYTE [SEDIMENTATION RATE] IN BLOOD: 3 MM/HR — SIGNIFICANT CHANGE UP
GLUCOSE SERPL-MCNC: 400 MG/DL — HIGH (ref 70–99)
HCT VFR BLD CALC: 47.2 % — SIGNIFICANT CHANGE UP (ref 39–50)
HDLC SERPL-MCNC: 41 MG/DL — SIGNIFICANT CHANGE UP
HGB BLD-MCNC: 16.7 G/DL — SIGNIFICANT CHANGE UP (ref 13–17)
IMM GRANULOCYTES NFR BLD AUTO: 0.3 % — SIGNIFICANT CHANGE UP (ref 0–1.5)
INR BLD: 1.01 — SIGNIFICANT CHANGE UP (ref 0.88–1.16)
LIPID PNL WITH DIRECT LDL SERPL: 66 MG/DL — SIGNIFICANT CHANGE UP
LYMPHOCYTES # BLD AUTO: 2.03 K/UL — SIGNIFICANT CHANGE UP (ref 1–3.3)
LYMPHOCYTES # BLD AUTO: 33.5 % — SIGNIFICANT CHANGE UP (ref 13–44)
MCHC RBC-ENTMCNC: 30.9 PG — SIGNIFICANT CHANGE UP (ref 27–34)
MCHC RBC-ENTMCNC: 35.4 GM/DL — SIGNIFICANT CHANGE UP (ref 32–36)
MCV RBC AUTO: 87.4 FL — SIGNIFICANT CHANGE UP (ref 80–100)
MONOCYTES # BLD AUTO: 0.54 K/UL — SIGNIFICANT CHANGE UP (ref 0–0.9)
MONOCYTES NFR BLD AUTO: 8.9 % — SIGNIFICANT CHANGE UP (ref 2–14)
NEUTROPHILS # BLD AUTO: 3.41 K/UL — SIGNIFICANT CHANGE UP (ref 1.8–7.4)
NEUTROPHILS NFR BLD AUTO: 56.3 % — SIGNIFICANT CHANGE UP (ref 43–77)
NRBC # BLD: 0 /100 WBCS — SIGNIFICANT CHANGE UP (ref 0–0)
PLATELET # BLD AUTO: 215 K/UL — SIGNIFICANT CHANGE UP (ref 150–400)
POTASSIUM SERPL-MCNC: SIGNIFICANT CHANGE UP MMOL/L (ref 3.5–5.3)
POTASSIUM SERPL-SCNC: SIGNIFICANT CHANGE UP MMOL/L (ref 3.5–5.3)
PROT SERPL-MCNC: 8 G/DL — SIGNIFICANT CHANGE UP (ref 6–8.3)
PROTHROM AB SERPL-ACNC: 11.4 SEC — SIGNIFICANT CHANGE UP (ref 10–12.9)
RBC # BLD: 5.4 M/UL — SIGNIFICANT CHANGE UP (ref 4.2–5.8)
RBC # FLD: 11.8 % — SIGNIFICANT CHANGE UP (ref 10.3–14.5)
SODIUM SERPL-SCNC: 138 MMOL/L — SIGNIFICANT CHANGE UP (ref 135–145)
TOTAL CHOLESTEROL/HDL RATIO MEASUREMENT: 5.3 RATIO — SIGNIFICANT CHANGE UP (ref 3.4–9.6)
TRIGL SERPL-MCNC: 551 MG/DL — HIGH (ref 10–149)
TROPONIN T SERPL-MCNC: <0.01 NG/ML — SIGNIFICANT CHANGE UP (ref 0–0.01)
WBC # BLD: 6.06 K/UL — SIGNIFICANT CHANGE UP (ref 3.8–10.5)
WBC # FLD AUTO: 6.06 K/UL — SIGNIFICANT CHANGE UP (ref 3.8–10.5)

## 2019-03-19 PROCEDURE — 70450 CT HEAD/BRAIN W/O DYE: CPT | Mod: 26,59

## 2019-03-19 PROCEDURE — 70496 CT ANGIOGRAPHY HEAD: CPT | Mod: 26

## 2019-03-19 PROCEDURE — 70498 CT ANGIOGRAPHY NECK: CPT | Mod: 26

## 2019-03-19 PROCEDURE — 70551 MRI BRAIN STEM W/O DYE: CPT | Mod: 26

## 2019-03-19 RX ORDER — INSULIN HUMAN 100 [IU]/ML
4 INJECTION, SOLUTION SUBCUTANEOUS ONCE
Qty: 0 | Refills: 0 | Status: DISCONTINUED | OUTPATIENT
Start: 2019-03-19 | End: 2019-03-20

## 2019-03-19 RX ORDER — SODIUM CHLORIDE 9 MG/ML
3 INJECTION INTRAMUSCULAR; INTRAVENOUS; SUBCUTANEOUS ONCE
Qty: 0 | Refills: 0 | Status: COMPLETED | OUTPATIENT
Start: 2019-03-19 | End: 2019-03-19

## 2019-03-19 RX ORDER — SODIUM CHLORIDE 9 MG/ML
1000 INJECTION INTRAMUSCULAR; INTRAVENOUS; SUBCUTANEOUS ONCE
Qty: 0 | Refills: 0 | Status: COMPLETED | OUTPATIENT
Start: 2019-03-19 | End: 2019-03-19

## 2019-03-19 RX ADMIN — SODIUM CHLORIDE 3 MILLILITER(S): 9 INJECTION INTRAMUSCULAR; INTRAVENOUS; SUBCUTANEOUS at 19:11

## 2019-03-19 RX ADMIN — SODIUM CHLORIDE 1000 MILLILITER(S): 9 INJECTION INTRAMUSCULAR; INTRAVENOUS; SUBCUTANEOUS at 23:00

## 2019-03-19 NOTE — ED PROVIDER NOTE - CARE PLAN
Principal Discharge DX:	Oculomotor nerve palsy, right eye  Secondary Diagnosis:	Diabetes  Secondary Diagnosis:	High cholesterol

## 2019-03-19 NOTE — ED ADULT NURSE NOTE - OBJECTIVE STATEMENT
54 y/o male w/ hx of DM, HTN, and HLD presents to the ED from ophthalmologist to r/o right nerve palsy. Pt c/o pain to right eye and right side of head when opening eye x 3 days. Pt denies blurred vision or double vision. Denies any other sx.

## 2019-03-19 NOTE — ED PROVIDER NOTE - CLINICAL SUMMARY MEDICAL DECISION MAKING FREE TEXT BOX
52 yo male with DM  elev chol with right CN 3 palsy and visual changes  saw optho today no clear evidence of glaucoma or eye path on SLE --CT /CTA  neg  MR  no obv mass or CVA ? will need further neuro eval and management dw Dr Iniguez earlier -

## 2019-03-19 NOTE — ED ADULT TRIAGE NOTE - CHIEF COMPLAINT QUOTE
Patient was sent from eye clinic for rt eye blurry vision , feels heavy with headache  for 3 days . Was sent for r/o partial nerve palsy .

## 2019-03-19 NOTE — ED ADULT NURSE REASSESSMENT NOTE - NS ED NURSE REASSESS COMMENT FT1
Patient currently in CT scan then to go to MRI. Elevated blood glucose noted. MD siegel informed. Care ongoing

## 2019-03-19 NOTE — ED PROVIDER NOTE - OBJECTIVE STATEMENT
54 yo male with hx of DM-elevated cholesterol  prior retropharyngeal abscess drained 1 year ago with 2 day hx of blurred vision in right eye- vague right sided headache - no facial numbness - but slight weakness to right lower half of face-  able to close both eyes and wrinkle forehead bilat  no jaw pain no neck pain no weakness of upper or lower extremities  no dizziness  no gait instability- saw eye MD today who noted right ptosis and lid lag was concerned about CN 3 palsy  ? CVA/ tumor - no prior hx of CVA /TIA  no prior hx bells palsy  hearing and taste wnl

## 2019-03-20 ENCOUNTER — TRANSCRIPTION ENCOUNTER (OUTPATIENT)
Age: 54
End: 2019-03-20

## 2019-03-20 ENCOUNTER — INPATIENT (INPATIENT)
Facility: HOSPITAL | Age: 54
LOS: 0 days | Discharge: HOME | DRG: 74 | End: 2019-03-20
Attending: PSYCHIATRY & NEUROLOGY | Admitting: PSYCHIATRY & NEUROLOGY
Payer: MEDICAID

## 2019-03-20 ENCOUNTER — INBOUND DOCUMENT (OUTPATIENT)
Age: 54
End: 2019-03-20

## 2019-03-20 VITALS
TEMPERATURE: 97 F | SYSTOLIC BLOOD PRESSURE: 146 MMHG | RESPIRATION RATE: 18 BRPM | DIASTOLIC BLOOD PRESSURE: 91 MMHG | OXYGEN SATURATION: 99 % | HEART RATE: 78 BPM

## 2019-03-20 DIAGNOSIS — E78.00 PURE HYPERCHOLESTEROLEMIA, UNSPECIFIED: ICD-10-CM

## 2019-03-20 DIAGNOSIS — Z29.9 ENCOUNTER FOR PROPHYLACTIC MEASURES, UNSPECIFIED: ICD-10-CM

## 2019-03-20 DIAGNOSIS — E11.40 TYPE 2 DIABETES MELLITUS WITH DIABETIC NEUROPATHY, UNSPECIFIED: ICD-10-CM

## 2019-03-20 DIAGNOSIS — E11.9 TYPE 2 DIABETES MELLITUS WITHOUT COMPLICATIONS: ICD-10-CM

## 2019-03-20 DIAGNOSIS — H49.01 THIRD [OCULOMOTOR] NERVE PALSY, RIGHT EYE: ICD-10-CM

## 2019-03-20 DIAGNOSIS — Z91.89 OTHER SPECIFIED PERSONAL RISK FACTORS, NOT ELSEWHERE CLASSIFIED: ICD-10-CM

## 2019-03-20 LAB
ANION GAP SERPL CALC-SCNC: 11 MMOL/L — SIGNIFICANT CHANGE UP (ref 5–17)
BUN SERPL-MCNC: 13 MG/DL — SIGNIFICANT CHANGE UP (ref 7–23)
CALCIUM SERPL-MCNC: 8.9 MG/DL — SIGNIFICANT CHANGE UP (ref 8.4–10.5)
CHLORIDE SERPL-SCNC: 104 MMOL/L — SIGNIFICANT CHANGE UP (ref 96–108)
CO2 SERPL-SCNC: 22 MMOL/L — SIGNIFICANT CHANGE UP (ref 22–31)
CREAT SERPL-MCNC: 0.77 MG/DL — SIGNIFICANT CHANGE UP (ref 0.5–1.3)
GLUCOSE BLDC GLUCOMTR-MCNC: 257 MG/DL — HIGH (ref 70–99)
GLUCOSE BLDC GLUCOMTR-MCNC: 284 MG/DL — HIGH (ref 70–99)
GLUCOSE SERPL-MCNC: 342 MG/DL — HIGH (ref 70–99)
HBA1C BLD-MCNC: 10.2 % — HIGH (ref 4–5.6)
HCT VFR BLD CALC: 40.9 % — SIGNIFICANT CHANGE UP (ref 39–50)
HGB BLD-MCNC: 14.4 G/DL — SIGNIFICANT CHANGE UP (ref 13–17)
MCHC RBC-ENTMCNC: 31 PG — SIGNIFICANT CHANGE UP (ref 27–34)
MCHC RBC-ENTMCNC: 35.2 GM/DL — SIGNIFICANT CHANGE UP (ref 32–36)
MCV RBC AUTO: 88.1 FL — SIGNIFICANT CHANGE UP (ref 80–100)
NRBC # BLD: 0 /100 WBCS — SIGNIFICANT CHANGE UP (ref 0–0)
PLATELET # BLD AUTO: 157 K/UL — SIGNIFICANT CHANGE UP (ref 150–400)
POTASSIUM SERPL-MCNC: 4 MMOL/L — SIGNIFICANT CHANGE UP (ref 3.5–5.3)
POTASSIUM SERPL-SCNC: 4 MMOL/L — SIGNIFICANT CHANGE UP (ref 3.5–5.3)
RBC # BLD: 4.64 M/UL — SIGNIFICANT CHANGE UP (ref 4.2–5.8)
RBC # FLD: 11.8 % — SIGNIFICANT CHANGE UP (ref 10.3–14.5)
SODIUM SERPL-SCNC: 137 MMOL/L — SIGNIFICANT CHANGE UP (ref 135–145)
WBC # BLD: 5.76 K/UL — SIGNIFICANT CHANGE UP (ref 3.8–10.5)
WBC # FLD AUTO: 5.76 K/UL — SIGNIFICANT CHANGE UP (ref 3.8–10.5)

## 2019-03-20 PROCEDURE — 93010 ELECTROCARDIOGRAM REPORT: CPT

## 2019-03-20 PROCEDURE — 99285 EMERGENCY DEPT VISIT HI MDM: CPT

## 2019-03-20 PROCEDURE — 99223 1ST HOSP IP/OBS HIGH 75: CPT

## 2019-03-20 RX ORDER — INSULIN LISPRO 100/ML
VIAL (ML) SUBCUTANEOUS
Qty: 0 | Refills: 0 | Status: DISCONTINUED | OUTPATIENT
Start: 2019-03-20 | End: 2019-03-20

## 2019-03-20 RX ORDER — SIMVASTATIN 20 MG/1
20 TABLET, FILM COATED ORAL AT BEDTIME
Qty: 0 | Refills: 0 | Status: DISCONTINUED | OUTPATIENT
Start: 2019-03-20 | End: 2019-03-20

## 2019-03-20 RX ORDER — FENOFIBRATE,MICRONIZED 130 MG
48 CAPSULE ORAL DAILY
Qty: 0 | Refills: 0 | Status: DISCONTINUED | OUTPATIENT
Start: 2019-03-20 | End: 2019-03-20

## 2019-03-20 RX ORDER — DEXTROSE 50 % IN WATER 50 %
15 SYRINGE (ML) INTRAVENOUS ONCE
Qty: 0 | Refills: 0 | Status: DISCONTINUED | OUTPATIENT
Start: 2019-03-20 | End: 2019-03-20

## 2019-03-20 RX ORDER — FAMOTIDINE 10 MG/ML
20 INJECTION INTRAVENOUS
Qty: 0 | Refills: 0 | Status: DISCONTINUED | OUTPATIENT
Start: 2019-03-20 | End: 2019-03-20

## 2019-03-20 RX ORDER — LISINOPRIL 2.5 MG/1
2.5 TABLET ORAL DAILY
Qty: 0 | Refills: 0 | Status: DISCONTINUED | OUTPATIENT
Start: 2019-03-20 | End: 2019-03-20

## 2019-03-20 RX ORDER — DEXTROSE 50 % IN WATER 50 %
25 SYRINGE (ML) INTRAVENOUS ONCE
Qty: 0 | Refills: 0 | Status: DISCONTINUED | OUTPATIENT
Start: 2019-03-20 | End: 2019-03-20

## 2019-03-20 RX ORDER — LISINOPRIL 2.5 MG/1
0 TABLET ORAL
Qty: 0 | Refills: 0 | COMMUNITY

## 2019-03-20 RX ORDER — GLUCAGON INJECTION, SOLUTION 0.5 MG/.1ML
1 INJECTION, SOLUTION SUBCUTANEOUS ONCE
Qty: 0 | Refills: 0 | Status: DISCONTINUED | OUTPATIENT
Start: 2019-03-20 | End: 2019-03-20

## 2019-03-20 RX ORDER — SODIUM CHLORIDE 9 MG/ML
1000 INJECTION, SOLUTION INTRAVENOUS
Qty: 0 | Refills: 0 | Status: DISCONTINUED | OUTPATIENT
Start: 2019-03-20 | End: 2019-03-20

## 2019-03-20 RX ORDER — INFLUENZA VIRUS VACCINE 15; 15; 15; 15 UG/.5ML; UG/.5ML; UG/.5ML; UG/.5ML
0.5 SUSPENSION INTRAMUSCULAR ONCE
Qty: 0 | Refills: 0 | Status: COMPLETED | OUTPATIENT
Start: 2019-03-20 | End: 2019-03-20

## 2019-03-20 RX ORDER — METFORMIN HYDROCHLORIDE 850 MG/1
0 TABLET ORAL
Qty: 0 | Refills: 0 | COMMUNITY

## 2019-03-20 RX ORDER — DEXTROSE 50 % IN WATER 50 %
12.5 SYRINGE (ML) INTRAVENOUS ONCE
Qty: 0 | Refills: 0 | Status: DISCONTINUED | OUTPATIENT
Start: 2019-03-20 | End: 2019-03-20

## 2019-03-20 RX ORDER — SIMVASTATIN 20 MG/1
0 TABLET, FILM COATED ORAL
Qty: 0 | Refills: 0 | COMMUNITY

## 2019-03-20 RX ADMIN — FAMOTIDINE 20 MILLIGRAM(S): 10 INJECTION INTRAVENOUS at 06:20

## 2019-03-20 RX ADMIN — INFLUENZA VIRUS VACCINE 0.5 MILLILITER(S): 15; 15; 15; 15 SUSPENSION INTRAMUSCULAR at 12:05

## 2019-03-20 RX ADMIN — Medication 48 MILLIGRAM(S): at 12:05

## 2019-03-20 RX ADMIN — Medication 6: at 08:33

## 2019-03-20 RX ADMIN — LISINOPRIL 2.5 MILLIGRAM(S): 2.5 TABLET ORAL at 06:20

## 2019-03-20 RX ADMIN — Medication 6: at 12:04

## 2019-03-20 NOTE — H&P ADULT - NSHPLABSRESULTS_GEN_ALL_CORE
LABS:                         16.7   6.06  )-----------( 215      ( 19 Mar 2019 19:17 )             47.2     03-19    138  |  101  |  16  ----------------------------<  400<H>  see note   |  22  |  0.81    Ca    10.0      19 Mar 2019 19:17    TPro  8.0  /  Alb  4.9  /  TBili  0.3  /  DBili  x   /  AST  see note  /  ALT  see note  /  AlkPhos  74  03-19    PT/INR - ( 19 Mar 2019 19:17 )   PT: 11.4 sec;   INR: 1.01          PTT - ( 19 Mar 2019 19:17 )  PTT:32.1 sec    CARDIAC MARKERS ( 19 Mar 2019 19:17 )  x     / <0.01 ng/mL / 83 U/L / x     / 1.6 ng/mL            RADIOLOGY, EKG & ADDITIONAL TESTS:     < from: CT Angio Head w/ IV Cont (03.19.19 @ 22:00) >    IMPRESSION:  1.  No hemodynamically significant stenosis or aneurysm.  2.  Mild bilateral submental lymphadenopathy.  3.  Unilateral fatty atrophy of the left parotid gland.    < end of copied text >    < from: CT Head No Cont (03.19.19 @ 20:29) >      IMPRESSION:  No acute intracranial hemorrhage, mass effect or evidence of recent   territorial infarction.    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 No evidence   of acute infarction.    < end of copied text >

## 2019-03-20 NOTE — DISCHARGE NOTE NURSING/CASE MANAGEMENT/SOCIAL WORK - NSDCFUADDAPPT_GEN_ALL_CORE_FT
Dr. Mcdowell - Monday 3/25 at 4pm.    Please call Dr. He if you have any sudden changes or concerns.

## 2019-03-20 NOTE — DISCHARGE NOTE NURSING/CASE MANAGEMENT/SOCIAL WORK - NSDCDPATPORTLINK_GEN_ALL_CORE
You can access the [facility] Patient Portal, offered by [facility], by registering with the following website: [email or web address]

## 2019-03-20 NOTE — DISCHARGE NOTE PROVIDER - CARE PROVIDERS DIRECT ADDRESSES
,DirectAddress_Unknown ,DirectAddress_Unknown,[email or web address]

## 2019-03-20 NOTE — DISCHARGE NOTE PROVIDER - NSDCFUADDAPPT_GEN_ALL_CORE_FT
Dr. Mcdowell - Monday 3/25 at 4pm. Dr. Mcdowell - Monday 3/25 at 4pm.    Please call Dr. He if you have any sudden changes or concerns.

## 2019-03-20 NOTE — DISCHARGE NOTE PROVIDER - HOSPITAL COURSE
53M PMHx of DM and HLD who was sent in from his ophthalmologist. For the past 3 days he has had weakness of his right eyelid to the point that it is interfering with his vision because he cannot keep his eye open. His vision is not blurry. His PCP sent him to an ophthalmologist for this, and his eyes were dilated and examined today. The ophthalmologist reported that there was nothing wrong with his eyes, and that he needed to go to the ED. He also has some tingling in his feet that occurs when he hits the top of his foot. He denies any fevers, chills, lightheadedness, chest pain, cough, SOB, N/V/D, dysuria, or leg swelling.     Upon arrival to the ED: T 98.7, HR 86, /91, R 18, SpO2 99%RA. CT, CTA, and MRI of his head were unremarkable. He was given 1L NS.     Pt's vital signs remained stable, and he was cleared by neurology for discharge. 53M PMHx of DM and HLD who was sent in from his ophthalmologist. For the past 3 days he has had weakness of his right eyelid to the point that it is interfering with his vision because he cannot keep his eye open. His vision is not blurry. His PCP sent him to an ophthalmologist for this, and his eyes were dilated and examined today. The ophthalmologist reported that there was nothing wrong with his eyes, and that he needed to go to the ED. He also has some tingling in his feet that occurs when he hits the top of his foot. He denies any fevers, chills, lightheadedness, chest pain, cough, SOB, N/V/D, dysuria, or leg swelling.     Upon arrival to the ED: T 98.7, HR 86, /91, R 18, SpO2 99%RA. CT, CTA, and MRI of his head were unremarkable. He was given 1L NS.     Pt's vital signs remained stable, and he was cleared by neurology for discharge. Diagnosis of third nerve palsy most probably secondary to underlying diabetes.

## 2019-03-20 NOTE — DISCHARGE NOTE PROVIDER - PROVIDER TOKENS
FREE:[LAST:[],FIRST:[lowell],PHONE:[phone]],FAX:[(   )    -],ADDRESS:[[address] [place] [address]]] FREE:[LAST:[],FIRST:[lowell],PHONE:[phone]],FAX:[(   )    -],ADDRESS:[-[address] [place] [address]]],PROVIDER:[TOKEN:[[id number]]]

## 2019-03-20 NOTE — DISCHARGE NOTE PROVIDER - NSDCCPCAREPLAN_GEN_ALL_CORE_FT
PRINCIPAL DISCHARGE DIAGNOSIS  Diagnosis: Oculomotor nerve palsy, right eye  Assessment and Plan of Treatment: You came to the hospital from your eye doctor because of closing of your right eye. We did CT scans and an MRI of your head which were negative for a stroke or other cause of your eye problems. We think that your eye problems are likely a complication of your diabetes. Please follow up with your PCP and endocrinologist for further monitoring and management of your diabetes.      SECONDARY DISCHARGE DIAGNOSES  Diagnosis: Diabetes  Assessment and Plan of Treatment: You have previously been diagnosed with diabetes. Yourr vision problems as well as the tingling you have been experiencing in your foot are likely a complication of poorly controlled diabetes. Please follow up with your PCP within one week for further monitoring and management of your diabetes. PRINCIPAL DISCHARGE DIAGNOSIS  Diagnosis: Oculomotor nerve palsy, right eye  Assessment and Plan of Treatment: You came to the hospital from your eye doctor because of closing of your right eye. We did CT scans and an MRI of your head which were negative for a stroke or other cause of your eye problems. We think that your eye problems are likely a complication of your diabetes. Please follow up with your PCP and endocrinologist for further monitoring and management of your diabetes.      SECONDARY DISCHARGE DIAGNOSES  Diagnosis: Diabetes  Assessment and Plan of Treatment: You have previously been diagnosed with diabetes. Your vision problems as well as the tingling you have been experiencing in your foot are likely a complication of poorly controlled diabetes. Your hemoglobin A1c, which is a measure of your blood sugar over the past few months, is 10.2 Please follow up with your PCP on Monday for further monitoring and management of your diabetes.

## 2019-03-20 NOTE — H&P ADULT - NSHPPHYSICALEXAM_GEN_ALL_CORE
VITAL SIGNS:  T(C): 36.7 (03-20-19 @ 01:20), Max: 37.1 (03-19-19 @ 17:55)  T(F): 98.1 (03-20-19 @ 01:20), Max: 98.7 (03-19-19 @ 17:55)  HR: 74 (03-20-19 @ 01:20) (74 - 86)  BP: 148/84 (03-20-19 @ 01:20) (146/91 - 149/79)  BP(mean): --  RR: 17 (03-20-19 @ 01:20) (17 - 18)  SpO2: 98% (03-20-19 @ 01:20) (98% - 99%)  Wt(kg): --    PHYSICAL EXAM:    Constitutional: WDWN resting comfortably in bed; NAD  Head: NC/AT  Eyes: +ptosis of R eye with inability to adduct the R eye. pupils 4mm, equally reactive to light  ENT: no nasal discharge; uvula midline, no oropharyngeal erythema or exudates; MMM  Neck: supple; no JVD or thyromegaly  Respiratory: CTA B/L; no W/R/R, no retractions  Cardiac: +S1/S2; RRR; no M/R/G; PMI non-displaced  Gastrointestinal: soft, NT/ND; no rebound or guarding; +BSx4  Back: spine midline, no bony tenderness or step-offs; no CVAT B/L  Extremities: WWP, no clubbing or cyanosis; no peripheral edema  Musculoskeletal: NROM x4; no joint swelling, tenderness or erythema  Vascular: 2+ DP pulses B/L  Dermatologic: hyperpigmented macules on the LEs bilaterally   Lymphatic: no submandibular or cervical LAD  Neurologic: no focal deficits aside from right eye   Psychiatric: affect and characteristics of appearance, verbalizations, behaviors are appropriate

## 2019-03-20 NOTE — H&P ADULT - HISTORY OF PRESENT ILLNESS
53M PMHx of DM and HLD who was sent in from his ophthalmologist. For the past 3 days he has had weakness of his right eyelid to the point that it is interfering with his vision because he cannot keep his eye open. He also was experiencing blurred vision, but currently his vision is much more blurred because his his eyes were dilated. His PCP sent him to an ophthalmologist for this, and his eyes were dilated and examined today. The ophthalmologist reported that there was nothing wrong with his eyes, and that he needed to go to the ED. He also has some tingling in his feet that occurs when he hits the top of his foot. He denies any fevers, chills, lightheadedness, chest pain, cough, SOB, N/V/D, dysuria, or leg swelling.   Upon arrival to the ED: T 98.7, HR 86, /91, R 18, SpO2 99%RA. CT, CTA, and MRI of his head were unremarkable. He was given 1L NS. 53M PMHx of DM and HLD who was sent in from his ophthalmologist. For the past 3 days he has had weakness of his right eyelid to the point that it is interfering with his vision because he cannot keep his eye open. His vision is not blurry. His PCP sent him to an ophthalmologist for this, and his eyes were dilated and examined today. The ophthalmologist reported that there was nothing wrong with his eyes, and that he needed to go to the ED. He also has some tingling in his feet that occurs when he hits the top of his foot. He denies any fevers, chills, lightheadedness, chest pain, cough, SOB, N/V/D, dysuria, or leg swelling.   Upon arrival to the ED: T 98.7, HR 86, /91, R 18, SpO2 99%RA. CT, CTA, and MRI of his head were unremarkable. He was given 1L NS.

## 2019-03-20 NOTE — DISCHARGE NOTE PROVIDER - CARE PROVIDER_API CALL
lowell ramirez  [address] [place] [address]  Phone: [phone]  Fax: (   )    -  Follow Up Time: lowell ramirez  [address] [place] [address]  Phone: [phone]  Fax: (   )    -  Follow Up Time:     Amy He)  Neurology; Vascular Neurology  [address] [address]  Phone: [phone]  Fax: [phone]  Follow Up Time:

## 2019-03-20 NOTE — H&P ADULT - NSICDXPROBLEM_GEN_ALL_CORE_FT
PROBLEM DIAGNOSES  Problem: Oculomotor nerve palsy, right eye  Assessment and Plan: Suspect this is 2/2 poorly controlled DM. CVA, tumor,  ruled out with MRI. Intracranial aneurysm not seen on CTA.   - Plan for tighter DM control  - Follow up neuro    Problem: Diabetes  Assessment and Plan: - Check A1c  - MISS, dose Lantus PRN    Problem: High cholesterol  Assessment and Plan: Continue simvastatin and fenofibrate    Problem: Need for prophylactic measure  Assessment and Plan: F: No IVF   E: Replete electrolytes for a K of 4 and Mg of 2  N: DASH/TLC/CC diet  P: HSQ  C: Full Code  C: Admit to [facility]    Problem: Transition of care performed with sharing of clinical summary  Assessment and Plan: PMD: Sultan Mcdowell

## 2019-03-20 NOTE — PATIENT PROFILE ADULT - NSPROHMDIABETMGMTSTRAT_GEN_A_NUR
Future Appointments  Date Time Provider Priya Goldsmith   8/14/2017 1:30 PM Manan Barksdale MD [facility] [address] 59       LOV 4/17    LAST LAB     LAST RX    PROTOCOL  Thyroid Supplements Protocol Passed  Pharmacy/ NO REFILLS ON FILE  MCG.  BEING [facility] medication therapy/routine screenings

## 2019-03-22 DIAGNOSIS — H49.01 THIRD [OCULOMOTOR] NERVE PALSY, RIGHT EYE: ICD-10-CM

## 2019-03-22 DIAGNOSIS — E11.40 TYPE 2 DIABETES MELLITUS WITH DIABETIC NEUROPATHY, UNSPECIFIED: ICD-10-CM

## 2019-03-22 DIAGNOSIS — Z79.84 LONG TERM (CURRENT) USE OF ORAL HYPOGLYCEMIC DRUGS: ICD-10-CM

## 2019-03-22 DIAGNOSIS — E78.00 PURE HYPERCHOLESTEROLEMIA, UNSPECIFIED: ICD-10-CM

## 2019-03-22 DIAGNOSIS — E11.65 TYPE 2 DIABETES MELLITUS WITH HYPERGLYCEMIA: ICD-10-CM

## 2019-03-26 ENCOUNTER — INBOUND DOCUMENT (OUTPATIENT)
Age: 54
End: 2019-03-26

## 2019-04-11 ENCOUNTER — APPOINTMENT (OUTPATIENT)
Dept: NEUROLOGY | Facility: CLINIC | Age: 54
End: 2019-04-11
Payer: MEDICAID

## 2019-04-11 VITALS
BODY MASS INDEX: 26.2 KG/M2 | DIASTOLIC BLOOD PRESSURE: 75 MMHG | SYSTOLIC BLOOD PRESSURE: 128 MMHG | WEIGHT: 163 LBS | HEART RATE: 88 BPM | HEIGHT: 66 IN | TEMPERATURE: 98 F | OXYGEN SATURATION: 97 %

## 2019-04-11 DIAGNOSIS — E11.42 TYPE 2 DIABETES MELLITUS WITH DIABETIC POLYNEUROPATHY: ICD-10-CM

## 2019-04-11 DIAGNOSIS — I10 ESSENTIAL (PRIMARY) HYPERTENSION: ICD-10-CM

## 2019-04-11 DIAGNOSIS — E78.00 PURE HYPERCHOLESTEROLEMIA, UNSPECIFIED: ICD-10-CM

## 2019-04-11 DIAGNOSIS — Z86.39 PERSONAL HISTORY OF OTHER ENDOCRINE, NUTRITIONAL AND METABOLIC DISEASE: ICD-10-CM

## 2019-04-11 PROCEDURE — 99215 OFFICE O/P EST HI 40 MIN: CPT

## 2019-04-11 RX ORDER — LISINOPRIL 20 MG/1
20 TABLET ORAL DAILY
Refills: 0 | Status: ACTIVE | COMMUNITY

## 2019-04-11 RX ORDER — GABAPENTIN 300 MG/1
300 CAPSULE ORAL AT BEDTIME
Qty: 30 | Refills: 5 | Status: ACTIVE | COMMUNITY
Start: 2019-04-11 | End: 1900-01-01

## 2019-04-11 RX ORDER — SITAGLIPTIN 100 MG/1
100 TABLET, FILM COATED ORAL DAILY
Refills: 0 | Status: ACTIVE | COMMUNITY

## 2019-04-11 RX ORDER — SIMVASTATIN 20 MG/1
20 TABLET, FILM COATED ORAL DAILY
Refills: 0 | Status: ACTIVE | COMMUNITY

## 2019-04-12 PROBLEM — Z86.39 HISTORY OF DIABETES MELLITUS: Status: RESOLVED | Noted: 2019-04-11 | Resolved: 2019-04-12

## 2019-04-19 PROBLEM — I10 HYPERTENSION: Status: ACTIVE | Noted: 2017-01-27

## 2019-04-19 PROBLEM — E78.00 HYPERCHOLESTEROLEMIA: Status: ACTIVE | Noted: 2017-01-27

## 2019-05-13 NOTE — ED ADULT TRIAGE NOTE - RESPIRATORY RATE (BREATHS/MIN)
Patient called and requested to know if she is able to take an extra Lasix she stated she has been SOB and believe to have fluid in the lungs and would like to be advised if it is ok to take. Please call her at [phone].    Return patient call stated that she has noted some increased shortness of breath recently but is also developed fairly significant diarrhea and has not been able to keep food down for 3 days    I recommended that she not take additional Lasix at this time and if she does not improve she should be evaluated by her internist    Patient also considering another thoracentesis sometime in the near future if she continues to have difficulty breathing patient will call for an appointment for an x-ray or possibly thoracentesis in the future depending on her respiratory status          
18

## 2019-07-15 ENCOUNTER — APPOINTMENT (OUTPATIENT)
Dept: NEUROLOGY | Facility: CLINIC | Age: 54
End: 2019-07-15

## 2019-09-25 NOTE — ED ADULT NURSE NOTE - NEURO GAIT
[FreeTextEntry6] : c/o headache everyday for past year. Takes Ibuprofen sometimes. Has never been seen here for headaches. Had CPE 4/12/19 and cleared for work.  no n/v no photophobia, pain mostly at temples. dull throbbing # 5    ate breakfast/lunch. no other complaints. \par  steady

## 2019-10-31 PROCEDURE — 70551 MRI BRAIN STEM W/O DYE: CPT

## 2019-10-31 PROCEDURE — 85027 COMPLETE CBC AUTOMATED: CPT

## 2019-10-31 PROCEDURE — 80048 BASIC METABOLIC PNL TOTAL CA: CPT

## 2019-10-31 PROCEDURE — 70496 CT ANGIOGRAPHY HEAD: CPT

## 2019-10-31 PROCEDURE — 80061 LIPID PANEL: CPT

## 2019-10-31 PROCEDURE — 82550 ASSAY OF CK (CPK): CPT

## 2019-10-31 PROCEDURE — 36415 COLL VENOUS BLD VENIPUNCTURE: CPT

## 2019-10-31 PROCEDURE — G0378: CPT

## 2019-10-31 PROCEDURE — 85610 PROTHROMBIN TIME: CPT

## 2019-10-31 PROCEDURE — 85652 RBC SED RATE AUTOMATED: CPT

## 2019-10-31 PROCEDURE — 82553 CREATINE MB FRACTION: CPT

## 2019-10-31 PROCEDURE — 84484 ASSAY OF TROPONIN QUANT: CPT

## 2019-10-31 PROCEDURE — 85025 COMPLETE CBC W/AUTO DIFF WBC: CPT

## 2019-10-31 PROCEDURE — 99285 EMERGENCY DEPT VISIT HI MDM: CPT

## 2019-10-31 PROCEDURE — 85730 THROMBOPLASTIN TIME PARTIAL: CPT

## 2019-10-31 PROCEDURE — 86140 C-REACTIVE PROTEIN: CPT

## 2019-10-31 PROCEDURE — 70450 CT HEAD/BRAIN W/O DYE: CPT

## 2019-10-31 PROCEDURE — 90686 IIV4 VACC NO PRSV 0.5 ML IM: CPT

## 2019-10-31 PROCEDURE — 83036 HEMOGLOBIN GLYCOSYLATED A1C: CPT

## 2019-10-31 PROCEDURE — 80053 COMPREHEN METABOLIC PANEL: CPT

## 2019-10-31 PROCEDURE — 93005 ELECTROCARDIOGRAM TRACING: CPT

## 2019-10-31 PROCEDURE — 82962 GLUCOSE BLOOD TEST: CPT

## 2019-10-31 PROCEDURE — 70498 CT ANGIOGRAPHY NECK: CPT

## 2019-11-14 NOTE — REVIEW OF SYSTEMS
[As Noted in HPI] : as noted in HPI [Negative] : Heme/Lymph [de-identified] : tingling of b/l feet (see HPI)

## 2019-11-14 NOTE — DATA REVIEWED
[de-identified] : MRI of the brain 3/19/19: Mild chronic white matter microangiopathic ischemic disease. No evidence \par of acute infarction.\par  [de-identified] : CT Angio Neck w/IV 3/19/19: The great vessels exhibit a normal branching pattern off the aortic arch.\par The right common carotid artery is normal in course and caliber. There is \par no hemodynamically significant stenosis of the right internal carotid \par artery. The right external carotid artery is also patent.\par The left common carotid artery is normal in course and caliber. There is \par no hemodynamically significant stenosis of the left internal carotid \par artery. The left external carotid artery is also patent.\par The extracranial segments of the vertebral arteries are patent.\par Evaluation of the neck soft tissues demonstrates unilateral fatty atrophy \par of the left parotid gland. There is nonspecific bilateral submental \par lymphadenopathy measuring up to 1.5 cm in the left station 1B and 1.3 cm \par in the right station 1B.\par \par CT Angio Head w/IV 3/19/19: IMPRESSION:\par 1.  No hemodynamically significant stenosis or aneurysm.\par 2.  Mild bilateral submental lymphadenopathy.\par 3.  Unilateral fatty atrophy of the left parotid gland.\par \par CT Head w/o contrast 3/19/19: IMPRESSION:\par No acute intracranial hemorrhage, mass effect or evidence of recent \par territorial infarction.\par

## 2019-11-14 NOTE — ASSESSMENT
[FreeTextEntry1] : 54 y/o right handed male with PMH of DM, HTN, HLD, no hx of smoking cigarettes, and alcohol use presents for post-hospitalization follow up and evaluation. Based on his physical examination and symptoms, most likely is his poorly controlled diabetes  the cause of his third nerve palsy. \par Plan:\par -RX Gabapentin 300mg- 1 cap at bedtime for diabetic peripheral neuropathy\par -Recommended ARI w/ follow up of vascular surgeon due to skin change to his PCP\par \par f/u in 3 months to evaluate his feet/pain related to his diabetes

## 2019-11-14 NOTE — PHYSICAL EXAM
[FreeTextEntry1] : Constitutional: alert, in no acute distress, well nourished and well developed.\par Psychiatric:  insight and judgment were intact.\par \par Neurologic: \par Mental Status: The patient is alert, attentive, and oriented to person, place, and time. Speech is clear and fluent with good repetition, comprehension, and naming.  \par Cranial nerves:\par CN II: Visual fields are full to confrontation. Fundoscopic exam is normal with sharp discs and no vascular changes.Visual acuity is intact. \par CN III, IV, VI: EOMI, PERRLA\par CN V: Facial sensation is intact to pinprick in all 3 divisions bilaterally. Corneal responses are intact.\par CN VII: Face is symmetric with normal eye closure and smile.\par CN VIII: Hearing is normal to rubbing fingers\par CN IX, X: Palate elevates symmetrically. Phonation is normal.\par CN XI: Head turning and shoulder shrug are intact and symmetric.\par CN XII: Tongue is midline with normal movements and no atrophy and no deviation with protrusion.\par Motor: There is no pronator drift of out-stretched arms. Muscle bulk and tone is normal. Strength is full bilaterally 5/5 on both UE and both LE. \par Sensation: light touch is intact; pinprick intact; vibration b/l intact. proprioception is intact.\par Reflexes:Reflexes are 2+ and symmetric at the biceps, triceps, knees, and ankles. Babinski reflex- negative\par Coordination: Rapid alternating movements and fine finger movements are intact. There is no dysmetria on finger-to-nose and heel-knee-shin. There are no abnormal or extraneous movements. Negative Romberg. \par Gait/Stance: Posture is normal. Gait is steady with normal steps, base, arm swing, and turning. Heel and toe walking are normal. \par \par Eyes: sclera and conjunctiva were normal.\par Skin: b/l legs with maculopapular scattered erythematous lesions\par \par

## 2019-11-14 NOTE — HISTORY OF PRESENT ILLNESS
[FreeTextEntry1] : 52 y/o right handed male with PMH of DM, HTN, HLD, no hx of smoking cigarettes, and alcohol use presents for post-hospitalization follow up and evaluation. Patient currently works as a . Patient was admitted on March 19, 2019 by his ophthalmologist due to abnormal right eyelid ptosis and “heavy feeling”. The patient reports prior to that day, he was experiencing his right eye lid feeling heavy which occurred during the daytime while he was getting a haircut. The second day, the right eye lid felt heavier but his vision was not affected. Simultaneously, he felt right sided temporal pain when he closed his left eye. He experienced brief dizziness. He denied any difficulty speaking, slurring, or other facial drooping. On the third day, he still had issues with his right eyelid as the first day and went to see his eye doctor.  He was discharged from the hospital on 3/20. Imaging showed no signs of strokes/infarct (see impressions).  Patient states he still has some slight eye pain and complains of b/l feet tingling when he pressed on the top of his feet; he also expresses changes in his skin of his legs such as spots/lesions. He denies headaches and says he just recently filled a new eyeglass prescription for bifocals after this incident. \par \par Stroke Risk factors: no family history of strokes\par -Sister: DM\par -Father: HTN\par -Mother: HLD\par a) Diabetes: A1C 10 (March 2019) - states his diet is not the best- very carbohydrate heavy and is working on changing his food choices. Patient follows his care under his PCP Dr. Collin Mcdowell. Since the admission, patient says his finger sticks at home are in the 120s/130s. \par b) HTN: patient reports daily adherence to his Lisinopril 2.5mg\par d) HLD: patient reports daily adherence to his simvastatin (Zocor)- 20 mg\par

## 2021-02-22 NOTE — ED ADULT NURSE NOTE - CAS TRG GENERAL NORM CIRC DET
[New Patient Visit] : a new patient visit [Referred By: _________] : Patient was referred by KRISTEN Strong peripheral pulses

## 2021-07-23 ENCOUNTER — EMERGENCY (EMERGENCY)
Facility: HOSPITAL | Age: 56
LOS: 1 days | Discharge: LEFT BEFORE DISCHARGE | End: 2021-07-23
Attending: EMERGENCY MEDICINE | Admitting: PSYCHIATRY & NEUROLOGY
Payer: MEDICARE

## 2021-07-23 ENCOUNTER — TRANSCRIPTION ENCOUNTER (OUTPATIENT)
Age: 56
End: 2021-07-23

## 2021-07-23 VITALS
WEIGHT: 162.92 LBS | SYSTOLIC BLOOD PRESSURE: 150 MMHG | RESPIRATION RATE: 16 BRPM | DIASTOLIC BLOOD PRESSURE: 74 MMHG | OXYGEN SATURATION: 99 % | HEART RATE: 83 BPM | HEIGHT: 66 IN | TEMPERATURE: 99 F

## 2021-07-23 DIAGNOSIS — H53.2 DIPLOPIA: ICD-10-CM

## 2021-07-23 DIAGNOSIS — E78.5 HYPERLIPIDEMIA, UNSPECIFIED: ICD-10-CM

## 2021-07-23 DIAGNOSIS — R51.9 HEADACHE, UNSPECIFIED: ICD-10-CM

## 2021-07-23 DIAGNOSIS — Z20.822 CONTACT WITH AND (SUSPECTED) EXPOSURE TO COVID-19: ICD-10-CM

## 2021-07-23 DIAGNOSIS — E11.65 TYPE 2 DIABETES MELLITUS WITH HYPERGLYCEMIA: ICD-10-CM

## 2021-07-23 DIAGNOSIS — Z98.49 CATARACT EXTRACTION STATUS, UNSPECIFIED EYE: Chronic | ICD-10-CM

## 2021-07-23 DIAGNOSIS — Z79.84 LONG TERM (CURRENT) USE OF ORAL HYPOGLYCEMIC DRUGS: ICD-10-CM

## 2021-07-23 LAB
A1C WITH ESTIMATED AVERAGE GLUCOSE RESULT: 9.3 % — HIGH (ref 4–5.6)
ALBUMIN SERPL ELPH-MCNC: 4.7 G/DL — SIGNIFICANT CHANGE UP (ref 3.3–5)
ALP SERPL-CCNC: 60 U/L — SIGNIFICANT CHANGE UP (ref 40–120)
ALT FLD-CCNC: 22 U/L — SIGNIFICANT CHANGE UP (ref 10–45)
ANION GAP SERPL CALC-SCNC: 11 MMOL/L — SIGNIFICANT CHANGE UP (ref 5–17)
APTT BLD: 32.6 SEC — SIGNIFICANT CHANGE UP (ref 27.5–35.5)
AST SERPL-CCNC: 22 U/L — SIGNIFICANT CHANGE UP (ref 10–40)
BASOPHILS # BLD AUTO: 0.03 K/UL — SIGNIFICANT CHANGE UP (ref 0–0.2)
BASOPHILS NFR BLD AUTO: 0.6 % — SIGNIFICANT CHANGE UP (ref 0–2)
BILIRUB SERPL-MCNC: 0.4 MG/DL — SIGNIFICANT CHANGE UP (ref 0.2–1.2)
BUN SERPL-MCNC: 27 MG/DL — HIGH (ref 7–23)
CALCIUM SERPL-MCNC: 9.7 MG/DL — SIGNIFICANT CHANGE UP (ref 8.4–10.5)
CHLORIDE SERPL-SCNC: 104 MMOL/L — SIGNIFICANT CHANGE UP (ref 96–108)
CHOLEST SERPL-MCNC: 176 MG/DL — SIGNIFICANT CHANGE UP
CO2 SERPL-SCNC: 23 MMOL/L — SIGNIFICANT CHANGE UP (ref 22–31)
CREAT SERPL-MCNC: 1.61 MG/DL — HIGH (ref 0.5–1.3)
EOSINOPHIL # BLD AUTO: 0.02 K/UL — SIGNIFICANT CHANGE UP (ref 0–0.5)
EOSINOPHIL NFR BLD AUTO: 0.4 % — SIGNIFICANT CHANGE UP (ref 0–6)
ESTIMATED AVERAGE GLUCOSE: 220 MG/DL — HIGH (ref 68–114)
GLUCOSE SERPL-MCNC: 308 MG/DL — HIGH (ref 70–99)
HCT VFR BLD CALC: 43.7 % — SIGNIFICANT CHANGE UP (ref 39–50)
HDLC SERPL-MCNC: 39 MG/DL — LOW
HGB BLD-MCNC: 15.1 G/DL — SIGNIFICANT CHANGE UP (ref 13–17)
IMM GRANULOCYTES NFR BLD AUTO: 0.6 % — SIGNIFICANT CHANGE UP (ref 0–1.5)
INR BLD: 1.03 — SIGNIFICANT CHANGE UP (ref 0.88–1.16)
LIPID PNL WITH DIRECT LDL SERPL: 98 MG/DL — SIGNIFICANT CHANGE UP
LYMPHOCYTES # BLD AUTO: 1.59 K/UL — SIGNIFICANT CHANGE UP (ref 1–3.3)
LYMPHOCYTES # BLD AUTO: 29.3 % — SIGNIFICANT CHANGE UP (ref 13–44)
MCHC RBC-ENTMCNC: 30.8 PG — SIGNIFICANT CHANGE UP (ref 27–34)
MCHC RBC-ENTMCNC: 34.6 GM/DL — SIGNIFICANT CHANGE UP (ref 32–36)
MCV RBC AUTO: 89 FL — SIGNIFICANT CHANGE UP (ref 80–100)
MONOCYTES # BLD AUTO: 0.5 K/UL — SIGNIFICANT CHANGE UP (ref 0–0.9)
MONOCYTES NFR BLD AUTO: 9.2 % — SIGNIFICANT CHANGE UP (ref 2–14)
NEUTROPHILS # BLD AUTO: 3.25 K/UL — SIGNIFICANT CHANGE UP (ref 1.8–7.4)
NEUTROPHILS NFR BLD AUTO: 59.9 % — SIGNIFICANT CHANGE UP (ref 43–77)
NON HDL CHOLESTEROL: 137 MG/DL — HIGH
NRBC # BLD: 0 /100 WBCS — SIGNIFICANT CHANGE UP (ref 0–0)
PLATELET # BLD AUTO: 181 K/UL — SIGNIFICANT CHANGE UP (ref 150–400)
POTASSIUM SERPL-MCNC: 4.6 MMOL/L — SIGNIFICANT CHANGE UP (ref 3.5–5.3)
POTASSIUM SERPL-SCNC: 4.6 MMOL/L — SIGNIFICANT CHANGE UP (ref 3.5–5.3)
PROT SERPL-MCNC: 7.4 G/DL — SIGNIFICANT CHANGE UP (ref 6–8.3)
PROTHROM AB SERPL-ACNC: 12.3 SEC — SIGNIFICANT CHANGE UP (ref 10.6–13.6)
RBC # BLD: 4.91 M/UL — SIGNIFICANT CHANGE UP (ref 4.2–5.8)
RBC # FLD: 11.9 % — SIGNIFICANT CHANGE UP (ref 10.3–14.5)
SARS-COV-2 RNA SPEC QL NAA+PROBE: NEGATIVE — SIGNIFICANT CHANGE UP
SODIUM SERPL-SCNC: 138 MMOL/L — SIGNIFICANT CHANGE UP (ref 135–145)
TRIGL SERPL-MCNC: 196 MG/DL — HIGH
TSH SERPL-MCNC: 0.65 UIU/ML — SIGNIFICANT CHANGE UP (ref 0.27–4.2)
WBC # BLD: 5.42 K/UL — SIGNIFICANT CHANGE UP (ref 3.8–10.5)
WBC # FLD AUTO: 5.42 K/UL — SIGNIFICANT CHANGE UP (ref 3.8–10.5)

## 2021-07-23 PROCEDURE — 93005 ELECTROCARDIOGRAM TRACING: CPT

## 2021-07-23 PROCEDURE — G1004: CPT

## 2021-07-23 PROCEDURE — 70496 CT ANGIOGRAPHY HEAD: CPT | Mod: MG

## 2021-07-23 PROCEDURE — 80061 LIPID PANEL: CPT

## 2021-07-23 PROCEDURE — 85610 PROTHROMBIN TIME: CPT

## 2021-07-23 PROCEDURE — 70498 CT ANGIOGRAPHY NECK: CPT | Mod: 26,MG

## 2021-07-23 PROCEDURE — 70450 CT HEAD/BRAIN W/O DYE: CPT | Mod: MG

## 2021-07-23 PROCEDURE — 99285 EMERGENCY DEPT VISIT HI MDM: CPT | Mod: 25

## 2021-07-23 PROCEDURE — 80053 COMPREHEN METABOLIC PANEL: CPT

## 2021-07-23 PROCEDURE — 36415 COLL VENOUS BLD VENIPUNCTURE: CPT

## 2021-07-23 PROCEDURE — 85730 THROMBOPLASTIN TIME PARTIAL: CPT

## 2021-07-23 PROCEDURE — 99222 1ST HOSP IP/OBS MODERATE 55: CPT

## 2021-07-23 PROCEDURE — 99285 EMERGENCY DEPT VISIT HI MDM: CPT

## 2021-07-23 PROCEDURE — 70450 CT HEAD/BRAIN W/O DYE: CPT | Mod: 26,MG,59

## 2021-07-23 PROCEDURE — 85025 COMPLETE CBC W/AUTO DIFF WBC: CPT

## 2021-07-23 PROCEDURE — 93010 ELECTROCARDIOGRAM REPORT: CPT

## 2021-07-23 PROCEDURE — 87635 SARS-COV-2 COVID-19 AMP PRB: CPT

## 2021-07-23 PROCEDURE — 70496 CT ANGIOGRAPHY HEAD: CPT | Mod: 26,MG

## 2021-07-23 PROCEDURE — 99254 IP/OBS CNSLTJ NEW/EST MOD 60: CPT

## 2021-07-23 PROCEDURE — 70498 CT ANGIOGRAPHY NECK: CPT | Mod: MG

## 2021-07-23 PROCEDURE — 84443 ASSAY THYROID STIM HORMONE: CPT

## 2021-07-23 PROCEDURE — 83036 HEMOGLOBIN GLYCOSYLATED A1C: CPT

## 2021-07-23 RX ORDER — FENOFIBRATE,MICRONIZED 130 MG
1 CAPSULE ORAL
Qty: 0 | Refills: 0 | DISCHARGE

## 2021-07-23 RX ORDER — METFORMIN HYDROCHLORIDE 850 MG/1
1 TABLET ORAL
Qty: 0 | Refills: 0 | DISCHARGE

## 2021-07-23 RX ORDER — LISINOPRIL 2.5 MG/1
2.5 TABLET ORAL DAILY
Refills: 0 | Status: DISCONTINUED | OUTPATIENT
Start: 2021-07-23 | End: 2021-07-23

## 2021-07-23 RX ORDER — ASPIRIN/CALCIUM CARB/MAGNESIUM 324 MG
81 TABLET ORAL DAILY
Refills: 0 | Status: DISCONTINUED | OUTPATIENT
Start: 2021-07-23 | End: 2021-07-23

## 2021-07-23 RX ORDER — ATORVASTATIN CALCIUM 80 MG/1
80 TABLET, FILM COATED ORAL AT BEDTIME
Refills: 0 | Status: DISCONTINUED | OUTPATIENT
Start: 2021-07-23 | End: 2021-07-23

## 2021-07-23 RX ORDER — ENOXAPARIN SODIUM 100 MG/ML
40 INJECTION SUBCUTANEOUS EVERY 24 HOURS
Refills: 0 | Status: DISCONTINUED | OUTPATIENT
Start: 2021-07-23 | End: 2021-07-23

## 2021-07-23 RX ORDER — EMPAGLIFLOZIN 10 MG/1
1 TABLET, FILM COATED ORAL
Qty: 0 | Refills: 0 | DISCHARGE

## 2021-07-23 RX ORDER — FENOFIBRATE,MICRONIZED 130 MG
145 CAPSULE ORAL DAILY
Refills: 0 | Status: DISCONTINUED | OUTPATIENT
Start: 2021-07-23 | End: 2021-07-23

## 2021-07-23 RX ORDER — LISINOPRIL 2.5 MG/1
1 TABLET ORAL
Qty: 0 | Refills: 0 | DISCHARGE

## 2021-07-23 RX ORDER — SODIUM CHLORIDE 9 MG/ML
1000 INJECTION INTRAMUSCULAR; INTRAVENOUS; SUBCUTANEOUS ONCE
Refills: 0 | Status: COMPLETED | OUTPATIENT
Start: 2021-07-23 | End: 2021-07-23

## 2021-07-23 RX ORDER — RANITIDINE HYDROCHLORIDE 150 MG/1
1 TABLET, FILM COATED ORAL
Qty: 0 | Refills: 0 | DISCHARGE

## 2021-07-23 RX ORDER — SITAGLIPTIN 50 MG/1
1 TABLET, FILM COATED ORAL
Qty: 0 | Refills: 0 | DISCHARGE

## 2021-07-23 RX ORDER — GLYBURIDE-METFORMIN HYDROCHLORIDE 1.25; 25 MG/1; MG/1
1 TABLET ORAL
Qty: 0 | Refills: 0 | DISCHARGE

## 2021-07-23 RX ORDER — SIMVASTATIN 20 MG/1
1 TABLET, FILM COATED ORAL
Qty: 0 | Refills: 0 | DISCHARGE

## 2021-07-23 RX ORDER — FAMOTIDINE 10 MG/ML
20 INJECTION INTRAVENOUS DAILY
Refills: 0 | Status: DISCONTINUED | OUTPATIENT
Start: 2021-07-23 | End: 2021-07-23

## 2021-07-23 RX ADMIN — SODIUM CHLORIDE 1000 MILLILITER(S): 9 INJECTION INTRAMUSCULAR; INTRAVENOUS; SUBCUTANEOUS at 13:47

## 2021-07-23 NOTE — ED PROVIDER NOTE - NEUROLOGICAL, MLM
cranial nerves 4-12 intact, strength 5/5 b/l upper and lower ext, sensation intact distally b/l upper and lower ext

## 2021-07-23 NOTE — ED PROVIDER NOTE - CARE PLAN
Principal Discharge DX:	Diplopia   Principal Discharge DX:	Diplopia  Secondary Diagnosis:	Uncontrolled diabetes mellitus

## 2021-07-23 NOTE — ED ADULT NURSE NOTE - OBJECTIVE STATEMENT
Pt AOX4. Pt c/o left eye dropping and left eye double vision for 4 days. Pt states "I was driving 4 days ago and I started to see double and the next morning I woke up and my eye was swollen and drooping". Pt denies fevers, chills, n/v, dizziness, weakness, numbness, tingling, changes in speech, SOB, chest pain. Pt speaking in full complete sentences. Respirations even and unlabored. No redness noted to sclera. No drainage from left eye. Sensory intact.

## 2021-07-23 NOTE — DISCHARGE NOTE PROVIDER - HOSPITAL COURSE
Patient is a 54 y/o male with PMHx of DM2 and HLD presenting with left eye ptosis, blurry vision and diplopia for 4 days. Pt states that on Monday he was driving home when he suddenly started to develop double vision in the left eye. Pt states the next day the left eye started to get droopy with worsening blurry and double vision that is worse with both eyes open. Pt states that there's pressure around the eye when it's open and overall difficult to keep open. Pt reports an instant radiating headache in the occipital area when the eye is open. Pt states he went to the eye doctor yesterday that said it may possibly be a 3rd nerve palsy and to get evaluated in the ED. Denies chest pain, SOB, recent eye trauma/injury, hx of stroke or N/V    Patient admitted to [facility] for glycemic management in setting of elevated hA1C (9.3). Patient decided to leave AGAINST MEDICAL ADVICE.   Patient advised on the risks of leaving AMA, patient verbalized understanding of risks. Patient agrees to set up appointment with PCP and endocrinologist.   Patient is a 56 y/o male with PMHx of DM2 and HLD presenting with left eye ptosis, blurry vision and diplopia for 4 days. Pt states that on Monday he was driving home when he suddenly started to develop double vision in the left eye. Pt states the next day the left eye started to get droopy with worsening blurry and double vision that is worse with both eyes open. Pt states that there's pressure around the eye when it's open and overall difficult to keep open. Pt reports an instant radiating headache in the occipital area when the eye is open. Pt states he went to the eye doctor yesterday that said it may possibly be a 3rd nerve palsy and to get evaluated in the ED. Denies chest pain, SOB, recent eye trauma/injury, hx of stroke or N/V    Patient admitted to [facility] for glycemic management in setting of elevated hA1C (9.3). Patient decided to leave AGAINST MEDICAL ADVICE.   Patient advised on the risks of leaving AMA, patient verbalized understanding of risks. Patient agrees to set up appointment with PCP and endocrinologist.     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 Patient left AMA from ED before getting any medications or going upstairs to [facility] for admission, so called both patient & his brother Ms. Condon and asked him to start taking aspirin(pt is afraid of taking plavix as he thinks too many antiplatelets can cause more GI symptoms) along with good control of vascular risk factor-BP<120/80, LDL between 50 & 70, A1C<7.0(he is recommended to see endocrine for better blood sugar control). Patient agrees to take OTC EC aspirin 81 mg daily. Patient is also recommended to call neurology office for appointment, further evaluation & management. Patient also instructed to come to ED immediately with any new signs/symptoms of stroke with weakness/numbness/speech difficulties/vision changes/imbalance/gait difficulties.   Patient is a 54 y/o male with PMHx of DM2 and HLD presenting with left eye ptosis, blurry vision and diplopia for 4 days. Pt states that on Monday he was driving home when he suddenly started to develop double vision in the left eye. Pt states the next day the left eye started to get droopy with worsening blurry and double vision that is worse with both eyes open. Pt states that there's pressure around the eye when it's open and overall difficult to keep open. Pt reports an instant radiating headache in the occipital area when the eye is open. Pt states he went to the eye doctor yesterday that said it may possibly be a 3rd nerve palsy and to get evaluated in the ED. Denies chest pain, SOB, recent eye trauma/injury, hx of stroke or N/V    Patient admitted to [facility] for glycemic management in setting of elevated hA1C (9.3). Patient decided to leave AGAINST MEDICAL ADVICE.   Patient advised on the risks of leaving AMA, patient verbalized understanding of risks. Patient agrees to set up appointment with PCP and endocrinologist.     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 Patient left AMA from ED before getting any medications or going upstairs to [facility] for admission, so called both patient & his brother Ms. Condon and asked him to start taking aspirin(pt is afraid of taking plavix as he thinks too many antiplatelets can cause more GI symptoms) along with good control of vascular risk factor-BP<120/80, LDL between 50 & 70, A1C<7.0(he is recommended to see endocrine for better blood sugar control). Patient agrees to take OTC EC aspirin 81 mg daily. Patient is also recommended to call neurology office for appointment, further evaluation & management. Patient also instructed to come to ED immediately with any new signs/symptoms of stroke with weakness/numbness/speech difficulties/vision changes/imbalance/gait difficulties. Also notified clinic staff to call him for OP appointment.

## 2021-07-23 NOTE — ED PROVIDER NOTE - ATTENDING CONTRIBUTION TO CARE
55 year old male w hx of HLD, DM presents to ED with concern for double vision in left eye that began several days ago.  He also c/o left eye ptosis, sensitivity to light and headache.  He went to his eye doctor and was told everything looked ok but concern for 3rd nerve palsy per paperwork.  On exam, patient is non toxic in appearance.  PERRL, EOMI.  HRRR, lungs clear.  Abd soft, NT/ND.  5/5 strength x 4 extremities, no drooping of mouth or nasalabial fold.  Sensation intact x 4 extremities.  Will obtain labs, CT imaging of head and obtain neuro consultation.  Will dispo accordingly.

## 2021-07-23 NOTE — ED PROVIDER NOTE - EYES, MLM
left eye +diplopia, +sluggish pupillary reaction, EOMI with binocular diplopia reported in all cardinal directions except to the patient's downward right

## 2021-07-23 NOTE — DISCHARGE NOTE PROVIDER - EXTENDED VTE YES NO FOR MLM ASPIRIN
See OT evaluation for all goals and OT POC.  Electronically signed by RUTH Stewart/HARPER on 4/3/2019 at 11:13 AM
Therapy evaluation completed. Please see daily notes and/or progress notes for details related to planned treatment interventions, goals and functional abilities.
,

## 2021-07-23 NOTE — H&P ADULT - ASSESSMENT
55y Male with PMHx of right third nerve palsy, DM2, HTN, and HLD presents today with left eye ptosis, blurry vision and diplopia x 4 days.

## 2021-07-23 NOTE — H&P ADULT - ATTENDING COMMENTS
Howardcoleman Martinez: 55 yr h/o HTN, uncontrolled DM, HLP, hyperTG, right eye ptosis in 2019, right cataract surgery 1 month ago(pt reports he had EKG, TTE, stress test prior to cataract surgery & all the tests are normal); presenting with left eye ptosis/horizontal diplopia with right upper & left upper gaze since 7/19. Pt denies headaches/pain with eye movements/blurry vision in individual eye. CTH-normal, CTA h/n-mild athero+. Neuro exam: AAO x3, CN-left eye ptosis+ but can open it with effort, mild left eye midriasis by 0.5 mm, right gaze nystagmus+, motor-5/5 in A4E’s, sensory-grossly intact, gait-normal.   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

## 2021-07-23 NOTE — DISCHARGE NOTE PROVIDER - NSDCCPCAREPLAN_GEN_ALL_CORE_FT
PRINCIPAL DISCHARGE DIAGNOSIS  Diagnosis: Uncontrolled diabetes mellitus  Assessment and Plan of Treatment: WHAT YOU NEED TO KNOW:  Type 2 diabetes is a disease that affects how your body uses glucose (sugar). Either your body cannot make enough insulin, or it cannot use the insulin correctly. It is important to keep diabetes controlled to prevent damage to your heart, blood vessels, and other organs.  DISCHARGE INSTRUCTIONS:  What you can do to manage your blood sugar levels:  Talk to your care team if you become stressed about diabetes care. Sometimes being able to fit diabetes care into your life can cause increased stress. The stress can cause you not to take care of yourself properly. Your care team can help by offering tips about self-care. Your care team may suggest you talk to a mental health provider. The provider can listen and offer help with self-care issues.  Make healthy food choices. Work with a dietitian to develop a meal plan that works for you and your schedule. A dietitian can help you learn how to eat the right amount of carbohydrates during your meals and snacks. Carbohydrates can raise your blood sugar if you eat too many at one time. Some foods that contain carbohydrates include breads, cereals, rice, pasta, and sweets.  Drink water. Water can help your kidneys function properly. Decrease the amount of drinks with sugar substitutes you have, such as diet sodas. Avoid sugary drinks, such as regular sodas and fruit juice.  Get regular physical activity. Physical activity can help you get to your target blood sugar level goal and manage your weight. Get at least 150 minutes of moderate to vigorous aerobic physical activity each week. Do not miss more than 2 days in a row. Do not sit longer than 30 minutes at a time. Your healthcare provider can help you create an activity plan. The plan can include the best activities for you and can help you build your strength and endurance.  Strength Training for Adults  Maintain a healthy weight. Ask your healthcare provider how much you should weigh. Ask him or her to help you create a safe weight loss plan if you      SECONDARY DISCHARGE DIAGNOSES  Diagnosis: Left against medical advice  Assessment and Plan of Treatment: Against Medical Advice  WHAT YOU NEED TO KNOW:  Discharge against medical advice means that you choose to leave the hospital before your healthcare provider recommends that you do. Your healthcare provider may still need to diagnose or treat your condition or your treatment may not be complete.  DISCHARGE INSTRUCTIONS:  Risks: Risks of leaving the hospital before your healthcare providers recommend include the following:  Your condition may cause other health problems if not treated properly.  You may need to be admitted to the hospital again for the same condition.  Your condition could become life-threatening.  Follow up with your healthcare provider as directed: Write down your questions so you remember to ask them during your visits.

## 2021-07-23 NOTE — ED PROVIDER NOTE - CLINICAL SUMMARY MEDICAL DECISION MAKING FREE TEXT BOX
56 y/o m hx DM, HLD presents c/o 4 days of binocular diplopia, left eye ptosis, headache when he opens left eye.  On exam pt with ?cranial nerve III palsy, rest of neuro exam intact, vss.  Labs noted with creatinine 1.6 baseline <1 , was given IV fluid.  CT head and CTA head/neck negative for acute findings.  Neurology consulted for possible stroke w/u, will f/u recommendations.

## 2021-07-23 NOTE — DISCHARGE NOTE PROVIDER - NSDCMRMEDTOKEN_GEN_ALL_CORE_FT
fenofibrate 134 mg oral capsule: 1 cap(s) orally once a day  glipiZIDE 10 mg oral tablet, extended release: 1 tab(s) orally once a day  Januvia 100 mg oral tablet: 1 tab(s) orally once a day  Jardiance 25 mg oral tablet: 1 tab(s) orally once a day (in the morning)  lisinopril 2.5 mg oral tablet: 1 tab(s) orally once a day  metFORMIN 1000 mg oral tablet: 1 tab(s) orally 2 times a day  simvastatin 20 mg oral tablet: 1 tab(s) orally once a day (at bedtime)

## 2021-07-23 NOTE — ED PROVIDER NOTE - OBJECTIVE STATEMENT
Patient is a 54 y/o male with PMHx of DM2 and HLD presenting with left eye ptosis, blurry vision and diplopia for 4 days. Pt states that on Monday he was driving home when he started to develop double vision in the left eye. Pt states the next day the left eye started to get droopy with worsening blurry and double vision that is worse with both eyes open. Patient is a 54 y/o male with PMHx of DM2 and HLD presenting with left eye ptosis, blurry vision and diplopia for 4 days. Pt states that on Monday he was driving home when he suddenly started to develop double vision in the left eye. Pt states the next day the left eye started to get droopy with worsening blurry and double vision that is worse with both eyes open. Pt states that there's pressure around the eye when it's open and overall difficult to keep open. Pt reports an instant radiating headache in the occipital area when the eye is open. Pt states he went to the eye doctor yesterday that said it may possibly be a 3rd nerve palsy and to get evaluated in the ED. Denies chest pain, SOB, recent eye trauma/injury, hx of stroke or N/V

## 2021-07-23 NOTE — ED ADULT NURSE NOTE - ED CARDIAC CAPILLARY REFILL
Tara Adorno), Orthopedics  [address] [address]  Phone: [phone]  Fax: [phone]
2 seconds or less

## 2021-07-23 NOTE — H&P ADULT - HISTORY OF PRESENT ILLNESS
**STROKE HPI***    HPI: 55y Male with PMHx of DM2 and HLD presents with left eye ptosis, blurry vision and diplopia x 4 days.     PAST MEDICAL & SURGICAL HISTORY:  Diabetes    High cholesterol    No significant past surgical history        FAMILY HISTORY:  No pertinent family history in first degree relatives        SOCIAL HISTORY:   Patient lives with *** at ***.   Smoking status:  Drinking:  Drug Use:     ROS: ***  Constitutional: No fever, weight loss or fatigue  Eyes: No eye pain, visual disturbances, or discharge  ENMT:  No difficulty hearing, tinnitus, vertigo; No sinus or throat pain  Neck: No pain or stiffness  Respiratory: No cough, wheezing, chills or hemoptysis  Cardiovascular: No chest pain, palpitations, shortness of breath, dizziness or leg swelling  Gastrointestinal: No abdominal pain. No nausea, vomiting or hematemesis; No diarrhea or constipation. Nohematochezia.  Genitourinary: No dysuria, frequency, hematuria or incontinence  Neurological: As per HPI  Skin: No itching, burning, rashes or lesions   Endocrine: No heat or cold intolerance; No hair loss  Musculoskeletal: No joint pain or swelling; No muscle, back or extremity pain  Psychiatric: No depression, anxiety, mood swings or difficulty sleeping  Heme/Lymph: No easy bruising or bleeding gums    T(C): 37 (07-23-21 @ 11:30), Max: 37 (07-23-21 @ 11:30)  HR: 83 (07-23-21 @ 11:30) (83 - 83)  BP: 150/74 (07-23-21 @ 11:30) (150/74 - 150/74)  RR: 16 (07-23-21 @ 11:30) (16 - 16)  SpO2: 99% (07-23-21 @ 11:30) (99% - 99%)    MEDICATION RECONCILIATION   MEDICATIONS  (STANDING):    MEDICATIONS  (PRN):    Allergies    No Known Allergies    Intolerances      Vital Signs Last 24 Hrs  T(C): 37 (23 Jul 2021 11:30), Max: 37 (23 Jul 2021 11:30)  T(F): 98.6 (23 Jul 2021 11:30), Max: 98.6 (23 Jul 2021 11:30)  HR: 83 (23 Jul 2021 11:30) (83 - 83)  BP: 150/74 (23 Jul 2021 11:30) (150/74 - 150/74)  BP(mean): --  RR: 16 (23 Jul 2021 11:30) (16 - 16)  SpO2: 99% (23 Jul 2021 11:30) (99% - 99%)    Physical exam:  General: No acute distress, awake and alert  Cardiovascular: Regular rate and rhythm, no murmurs, rubs, or gallops. No bruits  Pulmonary: Anterior breath sounds clear bilaterally, no crackles or wheezing. No use of accessory muscles  GI: Abdomen soft, non-tender, non-distended    Neurologic:  -Mental status: Awake, alert, oriented to person, place, and time. Speech is fluent with intact naming, repetition, and comprehension, no dysarthria. Recent and remote memory intact. Follows commands. Attention/concentration intact. Fund of knowledge appropriate.  -Cranial nerves:   II: Visual fields are full to confrontation.  III, IV, VI: Extraocular movements are intact without nystagmus. Pupils equally round and reactive to light  V:  Facial sensation V1-V3 equal and intact   VII: Face is symmetric with normal eye closure and smile  VIII: Hearing is bilaterally intact to finger rub  IX, X: Uvula is midline and soft palate rises symmetrically  XI: Head turning and shoulder shrug are intact.  XII: Tongue protrudes midline  Motor: Normal bulk and tone. No pronator drift. Strength bilateral upper extremity 5/5, bilateral lower extremities 5/5.  Rapid alternating movements intact and symmetric  Sensation: Intact to light touch bilaterally. No neglect or extinction on double simultaneous testing.  Coordination: No dysmetria on finger-to-nose and heel-to-shin bilaterally  Reflexes: Downgoing toes bilaterally   Gait: Narrow gait and steady    NIHSS: **** ASPECT Score: *** ICH Score: *** (GCS)    Fingerstick Blood Glucose: CAPILLARY BLOOD GLUCOSE        LABS:                        15.1   5.42  )-----------( 181      ( 23 Jul 2021 12:43 )             43.7     07-23    138  |  104  |  27<H>  ----------------------------<  308<H>  4.6   |  23  |  1.61<H>    Ca    9.7      23 Jul 2021 12:43    TPro  7.4  /  Alb  4.7  /  TBili  0.4  /  DBili  x   /  AST  22  /  ALT  22  /  AlkPhos  60  07-23    PT/INR - ( 23 Jul 2021 12:43 )   PT: 12.3 sec;   INR: 1.03          PTT - ( 23 Jul 2021 12:43 )  PTT:32.6 sec          RADIOLOGY & ADDITIONAL STUDIES:    HCT:     CTA:    -----------------------------------------------------------------------------------------    ASSESSMENT/PLAN:    55y Male w/ PMH ***. HCT showed ***. CTA showed ***. Given *** tPA was ***. Patient was admitted to **** for further workup    **STROKE HPI***    HPI: 55y Male with PMHx of right third nerve palsy, DM2 and HLD presents with left eye ptosis, blurry vision and diplopia x 4 days.     PAST MEDICAL & SURGICAL HISTORY:  Diabetes    High cholesterol    No significant past surgical history        FAMILY HISTORY:  No pertinent family history in first degree relatives        SOCIAL HISTORY:   Patient lives with *** at ***.   Smoking status:  Drinking:  Drug Use:     ROS: ***  Constitutional: No fever, weight loss or fatigue  Eyes: No eye pain, visual disturbances, or discharge  ENMT:  No difficulty hearing, tinnitus, vertigo; No sinus or throat pain  Neck: No pain or stiffness  Respiratory: No cough, wheezing, chills or hemoptysis  Cardiovascular: No chest pain, palpitations, shortness of breath, dizziness or leg swelling  Gastrointestinal: No abdominal pain. No nausea, vomiting or hematemesis; No diarrhea or constipation. Nohematochezia.  Genitourinary: No dysuria, frequency, hematuria or incontinence  Neurological: As per HPI  Skin: No itching, burning, rashes or lesions   Endocrine: No heat or cold intolerance; No hair loss  Musculoskeletal: No joint pain or swelling; No muscle, back or extremity pain  Psychiatric: No depression, anxiety, mood swings or difficulty sleeping  Heme/Lymph: No easy bruising or bleeding gums    T(C): 37 (07-23-21 @ 11:30), Max: 37 (07-23-21 @ 11:30)  HR: 83 (07-23-21 @ 11:30) (83 - 83)  BP: 150/74 (07-23-21 @ 11:30) (150/74 - 150/74)  RR: 16 (07-23-21 @ 11:30) (16 - 16)  SpO2: 99% (07-23-21 @ 11:30) (99% - 99%)    MEDICATION RECONCILIATION   MEDICATIONS  (STANDING):    MEDICATIONS  (PRN):    Allergies    No Known Allergies    Intolerances      Vital Signs Last 24 Hrs  T(C): 37 (23 Jul 2021 11:30), Max: 37 (23 Jul 2021 11:30)  T(F): 98.6 (23 Jul 2021 11:30), Max: 98.6 (23 Jul 2021 11:30)  HR: 83 (23 Jul 2021 11:30) (83 - 83)  BP: 150/74 (23 Jul 2021 11:30) (150/74 - 150/74)  BP(mean): --  RR: 16 (23 Jul 2021 11:30) (16 - 16)  SpO2: 99% (23 Jul 2021 11:30) (99% - 99%)    Physical exam:  General: No acute distress, awake and alert  Cardiovascular: Regular rate and rhythm, no murmurs, rubs, or gallops. No bruits  Pulmonary: Anterior breath sounds clear bilaterally, no crackles or wheezing. No use of accessory muscles  GI: Abdomen soft, non-tender, non-distended    Neurologic:  -Mental status: Awake, alert, oriented to person, place, and time. Speech is fluent with intact naming, repetition, and comprehension, no dysarthria. Recent and remote memory intact. Follows commands. Attention/concentration intact. Fund of knowledge appropriate.  -Cranial nerves:   II: Visual fields are full to confrontation.  III, IV, VI: Extraocular movements are intact without nystagmus. Pupils equally round and reactive to light  V:  Facial sensation V1-V3 equal and intact   VII: Face is symmetric with normal eye closure and smile  VIII: Hearing is bilaterally intact to finger rub  IX, X: Uvula is midline and soft palate rises symmetrically  XI: Head turning and shoulder shrug are intact.  XII: Tongue protrudes midline  Motor: Normal bulk and tone. No pronator drift. Strength bilateral upper extremity 5/5, bilateral lower extremities 5/5.  Rapid alternating movements intact and symmetric  Sensation: Intact to light touch bilaterally. No neglect or extinction on double simultaneous testing.  Coordination: No dysmetria on finger-to-nose and heel-to-shin bilaterally  Reflexes: Downgoing toes bilaterally   Gait: Narrow gait and steady    NIHSS: **** ASPECT Score: *** ICH Score: *** (GCS)    Fingerstick Blood Glucose: CAPILLARY BLOOD GLUCOSE        LABS:                        15.1   5.42  )-----------( 181      ( 23 Jul 2021 12:43 )             43.7     07-23    138  |  104  |  27<H>  ----------------------------<  308<H>  4.6   |  23  |  1.61<H>    Ca    9.7      23 Jul 2021 12:43    TPro  7.4  /  Alb  4.7  /  TBili  0.4  /  DBili  x   /  AST  22  /  ALT  22  /  AlkPhos  60  07-23    PT/INR - ( 23 Jul 2021 12:43 )   PT: 12.3 sec;   INR: 1.03          PTT - ( 23 Jul 2021 12:43 )  PTT:32.6 sec          RADIOLOGY & ADDITIONAL STUDIES:    HCT:     CTA:    -----------------------------------------------------------------------------------------    ASSESSMENT/PLAN:    55y Male w/ PMH ***. HCT showed ***. CTA showed ***. Given *** tPA was ***. Patient was admitted to **** for further workup    **STROKE HPI***    HPI: 55y Male with PMHx of right third nerve palsy, DM2, HTN, and HLD presents today with left eye ptosis, blurry vision and diplopia x 4 days. Patient was sent in from his ophthalmologist's office for further workup regarding possible nerve palsy. Patient states he was driving, (works as ) not doing anything out of his typical routine when he noticed he was experiencing double vision, which prompted him to see his ophthalmologist. On exam, patient has difficulty keeping left eye open as he states he experiences the diplopia with it open. Of note, patient denies any acute onset of additional numbness, weakness, tingling, slurred speech, blurry vision, dizziness, headache.       PAST MEDICAL & SURGICAL HISTORY:  Diabetes    High cholesterol    Hx. right cataract surgery 1 month ago        FAMILY HISTORY:  No pertinent family history in first degree relatives        SOCIAL HISTORY:   Smoking status: denies   Drinking: denies  Drug Use: denies     ROS: ***  Constitutional: No fever, weight loss or fatigue  Eyes: No eye pain, visual disturbances, or discharge  ENMT:  No difficulty hearing, tinnitus, vertigo; No sinus or throat pain  Neck: No pain or stiffness  Respiratory: No cough, wheezing, chills or hemoptysis  Cardiovascular: No chest pain, palpitations, shortness of breath, dizziness or leg swelling  Gastrointestinal: No abdominal pain. No nausea, vomiting or hematemesis; No diarrhea or constipation. Nohematochezia.  Genitourinary: No dysuria, frequency, hematuria or incontinence  Neurological: As per HPI  Skin: No itching, burning, rashes or lesions   Endocrine: No heat or cold intolerance; No hair loss  Musculoskeletal: No joint pain or swelling; No muscle, back or extremity pain  Psychiatric: No depression, anxiety, mood swings or difficulty sleeping  Heme/Lymph: No easy bruising or bleeding gums    T(C): 37 (07-23-21 @ 11:30), Max: 37 (07-23-21 @ 11:30)  HR: 83 (07-23-21 @ 11:30) (83 - 83)  BP: 150/74 (07-23-21 @ 11:30) (150/74 - 150/74)  RR: 16 (07-23-21 @ 11:30) (16 - 16)  SpO2: 99% (07-23-21 @ 11:30) (99% - 99%)    MEDICATION RECONCILIATION   MEDICATIONS  (STANDING):    MEDICATIONS  (PRN):    Allergies    No Known Allergies    Intolerances      Vital Signs Last 24 Hrs  T(C): 37 (23 Jul 2021 11:30), Max: 37 (23 Jul 2021 11:30)  T(F): 98.6 (23 Jul 2021 11:30), Max: 98.6 (23 Jul 2021 11:30)  HR: 83 (23 Jul 2021 11:30) (83 - 83)  BP: 150/74 (23 Jul 2021 11:30) (150/74 - 150/74)  BP(mean): --  RR: 16 (23 Jul 2021 11:30) (16 - 16)  SpO2: 99% (23 Jul 2021 11:30) (99% - 99%)    Physical exam:  General: No acute distress, awake and alert  Cardiovascular: Regular rate and rhythm, no murmurs, rubs, or gallops. No bruits  Pulmonary: Anterior breath sounds clear bilaterally, no crackles or wheezing. No use of accessory muscles  GI: Abdomen soft, non-tender, non-distended    Neurologic:  -Mental status: Awake, alert, oriented to person, place, and time. Speech is fluent with intact naming, repetition, and comprehension, no dysarthria. Recent and remote memory intact. Follows commands. Attention/concentration intact. Fund of knowledge appropriate.  -Cranial nerves:   II: Visual fields are full to confrontation.  III, IV, VI: Extraocular movements are intact without nystagmus. Pupils equally round and reactive to light  V:  Facial sensation V1-V3 equal and intact   VII: Face is symmetric with normal eye closure and smile  VIII: Hearing is bilaterally intact to finger rub  IX, X: Uvula is midline and soft palate rises symmetrically  XI: Head turning and shoulder shrug are intact.  XII: Tongue protrudes midline  Motor: Normal bulk and tone. No pronator drift. Strength bilateral upper extremity 5/5, bilateral lower extremities 5/5.  Rapid alternating movements intact and symmetric  Sensation: Intact to light touch bilaterally. No neglect or extinction on double simultaneous testing.  Coordination: No dysmetria on finger-to-nose and heel-to-shin bilaterally  Reflexes: Downgoing toes bilaterally   Gait: Narrow gait and steady    NIHSS: **** ASPECT Score: *** ICH Score: *** (GCS)    Fingerstick Blood Glucose: CAPILLARY BLOOD GLUCOSE        LABS:                        15.1   5.42  )-----------( 181      ( 23 Jul 2021 12:43 )             43.7     07-23    138  |  104  |  27<H>  ----------------------------<  308<H>  4.6   |  23  |  1.61<H>    Ca    9.7      23 Jul 2021 12:43    TPro  7.4  /  Alb  4.7  /  TBili  0.4  /  DBili  x   /  AST  22  /  ALT  22  /  AlkPhos  60  07-23    PT/INR - ( 23 Jul 2021 12:43 )   PT: 12.3 sec;   INR: 1.03          PTT - ( 23 Jul 2021 12:43 )  PTT:32.6 sec          RADIOLOGY & ADDITIONAL STUDIES:    HCT:     CTA:    -----------------------------------------------------------------------------------------    ASSESSMENT/PLAN:    55y Male w/ PMH ***. HCT showed ***. CTA showed ***. Given *** tPA was ***. Patient was admitted to **** for further workup    **STROKE HPI***    HPI: 55y Male with PMHx of right third nerve palsy, DM2, HTN, and HLD presents today with left eye ptosis, blurry vision and diplopia x 4 days. Patient was sent in from his ophthalmologist's office for further workup regarding possible nerve palsy. Patient states he was driving, (works as ) not doing anything out of his typical routine when he noticed he was experiencing double vision, which prompted him to see his ophthalmologist. On exam, patient has difficulty keeping left eye open as he states he experiences the diplopia with it open. Of note, patient denies any acute onset of additional numbness, weakness, tingling, slurred speech, blurry vision, dizziness, headache.       PAST MEDICAL & SURGICAL HISTORY:  Diabetes    High cholesterol    Hx. right cataract surgery 1 month ago        FAMILY HISTORY:  No pertinent family history in first degree relatives        SOCIAL HISTORY:   Smoking status: denies   Drinking: denies  Drug Use: denies     ROS: ***  Constitutional: No fever, weight loss or fatigue  Eyes: No eye pain, visual disturbances, or discharge  ENMT:  No difficulty hearing, tinnitus, vertigo; No sinus or throat pain  Neck: No pain or stiffness  Respiratory: No cough, wheezing, chills or hemoptysis  Cardiovascular: No chest pain, palpitations, shortness of breath, dizziness or leg swelling  Gastrointestinal: No abdominal pain. No nausea, vomiting or hematemesis; No diarrhea or constipation. Nohematochezia.  Genitourinary: No dysuria, frequency, hematuria or incontinence  Neurological: As per HPI  Skin: No itching, burning, rashes or lesions   Endocrine: No heat or cold intolerance; No hair loss  Musculoskeletal: No joint pain or swelling; No muscle, back or extremity pain  Psychiatric: No depression, anxiety, mood swings or difficulty sleeping  Heme/Lymph: No easy bruising or bleeding gums    T(C): 37 (07-23-21 @ 11:30), Max: 37 (07-23-21 @ 11:30)  HR: 83 (07-23-21 @ 11:30) (83 - 83)  BP: 150/74 (07-23-21 @ 11:30) (150/74 - 150/74)  RR: 16 (07-23-21 @ 11:30) (16 - 16)  SpO2: 99% (07-23-21 @ 11:30) (99% - 99%)    MEDICATION RECONCILIATION   MEDICATIONS  (STANDING):    MEDICATIONS  (PRN):    Allergies    No Known Allergies    Intolerances      Vital Signs Last 24 Hrs  T(C): 37 (23 Jul 2021 11:30), Max: 37 (23 Jul 2021 11:30)  T(F): 98.6 (23 Jul 2021 11:30), Max: 98.6 (23 Jul 2021 11:30)  HR: 83 (23 Jul 2021 11:30) (83 - 83)  BP: 150/74 (23 Jul 2021 11:30) (150/74 - 150/74)  BP(mean): --  RR: 16 (23 Jul 2021 11:30) (16 - 16)  SpO2: 99% (23 Jul 2021 11:30) (99% - 99%)    Physical exam:  General: No acute distress, awake and alert  Cardiovascular: Regular rate and rhythm, no murmurs, rubs, or gallops. No bruits  Pulmonary: Anterior breath sounds clear bilaterally, no crackles or wheezing. No use of accessory muscles  GI: Abdomen soft, non-tender, non-distended    Neurologic:  -Mental status: Awake, alert, oriented to person, place, and time. Speech is fluent with intact naming, repetition, and comprehension, no dysarthria. Recent and remote memory intact. Follows commands. Attention/concentration intact. Fund of knowledge appropriate.  -Cranial nerves:   II: Visual fields are full to confrontation.  III, IV, VI: Extraocular movements are intact without nystagmus. Pupils equally round and reactive to light  V:  Facial sensation V1-V3 equal and intact   VII: Face is symmetric with normal eye closure and smile  VIII: Hearing is bilaterally intact to finger rub  IX, X: Uvula is midline and soft palate rises symmetrically  XI: Head turning and shoulder shrug are intact.  XII: Tongue protrudes midline  Motor: Normal bulk and tone. No pronator drift. Strength bilateral upper extremity 5/5, bilateral lower extremities 5/5.  Rapid alternating movements intact and symmetric  Sensation: Intact to light touch bilaterally. No neglect or extinction on double simultaneous testing.  Coordination: No dysmetria on finger-to-nose and heel-to-shin bilaterally  Reflexes: Downgoing toes bilaterally   Gait: Narrow gait and steady    NIHSS: **** ASPECT Score: *** ICH Score: *** (GCS)    Fingerstick Blood Glucose: CAPILLARY BLOOD GLUCOSE        LABS:                        15.1   5.42  )-----------( 181      ( 23 Jul 2021 12:43 )             43.7     07-23    138  |  104  |  27<H>  ----------------------------<  308<H>  4.6   |  23  |  1.61<H>    Ca    9.7      23 Jul 2021 12:43    TPro  7.4  /  Alb  4.7  /  TBili  0.4  /  DBili  x   /  AST  22  /  ALT  22  /  AlkPhos  60  07-23    PT/INR - ( 23 Jul 2021 12:43 )   PT: 12.3 sec;   INR: 1.03          PTT - ( 23 Jul 2021 12:43 )  PTT:32.6 sec          RADIOLOGY & ADDITIONAL STUDIES:    HCT: < from: CT Head No Cont (07.23.21 @ 13:01) >  IMPRESSION:    No acute intracranial abnormality.    Specifically, no acute intracranial hemorrhage, mass effect or recent transcortical infarct is present to explain symptoms. Stable ventriculomegaly, mild in degree and possibly developmental in natureor secondary to central-predominant volume loss along with stable white matter ischemic change.    < end of copied text >      CTA: < from: CT Angio Head w/ IV Cont (07.23.21 @ 13:43) >  IMPRESSION: No intracranial arterial steno-occlusive disease to explain symptoms. No aneurysm. Normal variants, as above.    < from: CT Angio Neck w/ IV Cont (07.23.21 @ 13:43) >  IMPRESSION: No arterial steno-occlusive disease or dissection in the neck.

## 2021-07-23 NOTE — ED ADULT NURSE NOTE - CHPI ED NUR SYMPTOMS NEG
no change in level of consciousness/no confusion/no dizziness/no fever/no nausea/no numbness/no vomiting/no weakness

## 2021-07-23 NOTE — ED ADULT NURSE NOTE - CAS ED AMA REASON YN
Pt states "I don't want to stay in the hospital I would rather follow up outpatient". "My MD told me I can take care of this later in the week"./Yes

## 2021-07-23 NOTE — ED ADULT TRIAGE NOTE - CHIEF COMPLAINT QUOTE
Pt c/o left eye swelling and double vision x4 days. States he has trouble opening the eye. Denies recent trauma, dizziness. States he has pain to head when opening the eye. Neuro intact.

## 2021-07-28 PROBLEM — I10 ESSENTIAL (PRIMARY) HYPERTENSION: Chronic | Status: ACTIVE | Noted: 2021-07-23

## 2021-07-28 PROBLEM — H49.00 THIRD [OCULOMOTOR] NERVE PALSY, UNSPECIFIED EYE: Chronic | Status: ACTIVE | Noted: 2021-07-23

## 2021-07-29 ENCOUNTER — APPOINTMENT (OUTPATIENT)
Dept: NEUROLOGY | Facility: CLINIC | Age: 56
End: 2021-07-29
Payer: MEDICAID

## 2021-07-29 VITALS
SYSTOLIC BLOOD PRESSURE: 118 MMHG | DIASTOLIC BLOOD PRESSURE: 77 MMHG | RESPIRATION RATE: 16 BRPM | HEIGHT: 66 IN | HEART RATE: 83 BPM | TEMPERATURE: 97.9 F | BODY MASS INDEX: 24.75 KG/M2 | WEIGHT: 154 LBS | OXYGEN SATURATION: 98 %

## 2021-07-29 DIAGNOSIS — H49.00 THIRD [OCULOMOTOR] NERVE PALSY, UNSPECIFIED EYE: ICD-10-CM

## 2021-07-29 DIAGNOSIS — E11.9 TYPE 2 DIABETES MELLITUS W/OUT COMPLICATIONS: ICD-10-CM

## 2021-07-29 PROCEDURE — 99215 OFFICE O/P EST HI 40 MIN: CPT

## 2021-07-29 RX ORDER — CLOPIDOGREL BISULFATE 75 MG/1
75 TABLET, FILM COATED ORAL
Qty: 21 | Refills: 0 | Status: ACTIVE | COMMUNITY
Start: 2021-07-29 | End: 1900-01-01

## 2021-08-24 ENCOUNTER — RX RENEWAL (OUTPATIENT)
Age: 56
End: 2021-08-24

## 2021-09-14 ENCOUNTER — APPOINTMENT (OUTPATIENT)
Dept: MRI IMAGING | Facility: HOSPITAL | Age: 56
End: 2021-09-14

## 2021-09-14 ENCOUNTER — OUTPATIENT (OUTPATIENT)
Dept: OUTPATIENT SERVICES | Facility: HOSPITAL | Age: 56
LOS: 1 days | End: 2021-09-14
Payer: MEDICAID

## 2021-09-14 DIAGNOSIS — Z98.49 CATARACT EXTRACTION STATUS, UNSPECIFIED EYE: Chronic | ICD-10-CM

## 2021-09-14 PROCEDURE — 70551 MRI BRAIN STEM W/O DYE: CPT | Mod: 26

## 2021-09-14 PROCEDURE — 70551 MRI BRAIN STEM W/O DYE: CPT

## 2022-12-28 NOTE — ED PROVIDER NOTE - NS ED MD DISPO SPECIAL CONSIDERATION1
Patient attended Phase 2 Cardiac Rehab Exercise Session. Further documentation will be completed in [facility] and will be scanned into the medical record upon discharge.    
None

## 2023-04-26 NOTE — DISCHARGE NOTE NURSING/CASE MANAGEMENT/SOCIAL WORK - NSCORESITESY/N_GEN_A_CORE_RD
Spoke with patients brother Jerzy Rivas ([phone]) Clarified his home health he had prior to coming to hospital. He stated it was [facility] and the nurse was named Letty. Her phone number is [phone]. [facility] phone number is ([phone]) . Will continue to follow up for discharge needs. Brother request a heads up when his brother is discharged because he will be coming from [place]. Reassured him we will call.     Shemar Manzo RN    [phone]     No

## 2024-06-19 NOTE — ED PROVIDER NOTE - CADM POA PRESS ULCER
Detail Level: Detailed Quality 226: Preventive Care And Screening: Tobacco Use: Screening And Cessation Intervention: Patient screened for tobacco use and is an ex/non-smoker Quality 130: Documentation Of Current Medications In The Medical Record: Current Medications Documented Quality 431: Preventive Care And Screening: Unhealthy Alcohol Use - Screening: Patient not identified as an unhealthy alcohol user when screened for unhealthy alcohol use using a systematic screening method Quality 47: Advance Care Plan: Advance Care Planning discussed and documented; advance care plan or surrogate decision maker documented in the medical record. No